# Patient Record
Sex: FEMALE | Race: WHITE | NOT HISPANIC OR LATINO | Employment: FULL TIME | URBAN - METROPOLITAN AREA
[De-identification: names, ages, dates, MRNs, and addresses within clinical notes are randomized per-mention and may not be internally consistent; named-entity substitution may affect disease eponyms.]

---

## 2017-10-24 ENCOUNTER — HOSPITAL ENCOUNTER (EMERGENCY)
Facility: HOSPITAL | Age: 37
Discharge: HOME | End: 2017-10-24
Attending: EMERGENCY MEDICINE

## 2017-10-24 VITALS
TEMPERATURE: 97 F | RESPIRATION RATE: 20 BRPM | HEART RATE: 88 BPM | SYSTOLIC BLOOD PRESSURE: 148 MMHG | BODY MASS INDEX: 47.84 KG/M2 | DIASTOLIC BLOOD PRESSURE: 91 MMHG | HEIGHT: 62 IN | OXYGEN SATURATION: 100 % | WEIGHT: 260 LBS

## 2017-10-24 DIAGNOSIS — R55 SYNCOPE: ICD-10-CM

## 2017-10-24 LAB
ALBUMIN SERPL BCP-MCNC: 4.2 G/DL
ALP SERPL-CCNC: 55 U/L
ALT SERPL W/O P-5'-P-CCNC: 21 U/L
ANION GAP SERPL CALC-SCNC: 15 MMOL/L
AST SERPL-CCNC: 14 U/L
B-HCG UR QL: NEGATIVE
BASOPHILS # BLD AUTO: 0.1 K/UL
BASOPHILS NFR BLD: 1 %
BILIRUB SERPL-MCNC: 0.3 MG/DL
BUN SERPL-MCNC: 17 MG/DL
CALCIUM SERPL-MCNC: 9.9 MG/DL
CHLORIDE SERPL-SCNC: 104 MMOL/L
CO2 SERPL-SCNC: 20 MMOL/L
CREAT SERPL-MCNC: 0.8 MG/DL
CTP QC/QA: YES
DIFFERENTIAL METHOD: ABNORMAL
EOSINOPHIL # BLD AUTO: 0.2 K/UL
EOSINOPHIL NFR BLD: 1.6 %
ERYTHROCYTE [DISTWIDTH] IN BLOOD BY AUTOMATED COUNT: 17.1 %
EST. GFR  (AFRICAN AMERICAN): >60 ML/MIN/1.73 M^2
EST. GFR  (NON AFRICAN AMERICAN): >60 ML/MIN/1.73 M^2
GLUCOSE SERPL-MCNC: 171 MG/DL
HCT VFR BLD AUTO: 41.8 %
HGB BLD-MCNC: 13.6 G/DL
LYMPHOCYTES # BLD AUTO: 3.9 K/UL
LYMPHOCYTES NFR BLD: 25.9 %
MCH RBC QN AUTO: 24.4 PG
MCHC RBC AUTO-ENTMCNC: 32.5 G/DL
MCV RBC AUTO: 75 FL
MONOCYTES # BLD AUTO: 0.6 K/UL
MONOCYTES NFR BLD: 3.8 %
NEUTROPHILS # BLD AUTO: 10.1 K/UL
NEUTROPHILS NFR BLD: 67.7 %
PLATELET # BLD AUTO: 456 K/UL
PMV BLD AUTO: 8.3 FL
POTASSIUM SERPL-SCNC: 4.3 MMOL/L
PROT SERPL-MCNC: 8.4 G/DL
RBC # BLD AUTO: 5.57 M/UL
SODIUM SERPL-SCNC: 139 MMOL/L
WBC # BLD AUTO: 14.9 K/UL

## 2017-10-24 PROCEDURE — 36415 COLL VENOUS BLD VENIPUNCTURE: CPT

## 2017-10-24 PROCEDURE — 81025 URINE PREGNANCY TEST: CPT | Performed by: EMERGENCY MEDICINE

## 2017-10-24 PROCEDURE — 99283 EMERGENCY DEPT VISIT LOW MDM: CPT | Mod: 25

## 2017-10-24 PROCEDURE — 85025 COMPLETE CBC W/AUTO DIFF WBC: CPT

## 2017-10-24 PROCEDURE — 80053 COMPREHEN METABOLIC PANEL: CPT

## 2017-10-24 PROCEDURE — 93005 ELECTROCARDIOGRAM TRACING: CPT

## 2017-10-24 NOTE — ED NOTES
"Pt aaox3. NAD. Here with c/o syncope that occurred at work pta. Pt states she was at work and felt lightheaded then passed out "on my register". Pt denies head injury. C/o HA. Denies vomiting diarrhea fever or chills. abd soft nontender and non distended. Skin warm dry and intact. resp even and unlabored. denies CP or SOB. Pt recently dx with viral illness.   "

## 2017-10-24 NOTE — ED PROVIDER NOTES
Chief complaint:  Loss of Consciousness (reports lightheaded and 1 episode of syncope today at work)      HPI:  Jennie Harmon is a 37 y.o. female presenting with recurrent episodes of syncope on numerous prior occasions similar to this.  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  No nausea, vomiting, diaphoresis, chest pain, dyspnea.  Patient walked into the next room and was lower down into a chair slowly with only brief loss of consciousness and no injury.  She was supported by other staff members.  There is no seizure activity.  There is no tongue biting or bladder or bowel incontinence.  She feels normal now.  She has a history of chronic headaches with similar, mild, frontal headache unchanged since earlier in the evening.  No visual changes, numbness, weakness.    ROS: As per HPI and below:  Positive for mild headache.  No neck pain, chest pain, dyspnea, hemoptysis, fever, current abdominal pain, rashes, blood in the stools, dysuria, numbness, weakness, swelling, vaginal discharge or pain. The patient/family denies dysphagia,  joint swelling, easy bruising.    Review of patient's allergies indicates:  No Known Allergies    Patient's Medications   New Prescriptions    No medications on file   Previous Medications    ALBUTEROL (ACCUNEB) 0.63 MG/3 ML NEBU    Take 0.63 mg by nebulization every 6 (six) hours as needed.    AMITRIPTYLINE (ELAVIL) 10 MG TABLET    Take 10 mg by mouth every evening.    CANAGLIFLOZIN (INVOKANA) 300 MG TAB    Take 300 mg by mouth once daily.    CITALOPRAM (CELEXA) 10 MG TABLET    Take 10 mg by mouth once daily.    GABAPENTIN (NEURONTIN) 300 MG CAPSULE    Take 300 mg by mouth 2 (two) times daily.    LISINOPRIL (PRINIVIL,ZESTRIL) 5 MG TABLET    Take 5 mg by mouth once daily.    LORATADINE (CLARITIN) 10 MG TABLET    Take 10 mg by mouth.    MEDROXYPROGESTERONE (DEPO-PROVERA) 150 MG/ML INJECTION    Inject 150 mg into  the muscle every 3 (three) months.    METFORMIN (GLUCOPHAGE-XR) 500 MG 24 HR TABLET    Take 500 mg by mouth 2 (two) times daily with meals.    MONTELUKAST (SINGULAIR) 10 MG TABLET    Take 10 mg by mouth every evening.    OMEPRAZOLE (PRILOSEC) 40 MG CAPSULE    Take 1 capsule (40 mg total) by mouth once daily.    SUCRALFATE (CARAFATE) 1 GRAM TABLET    Take 1 g by mouth 4 (four) times daily before meals and nightly.   Modified Medications    No medications on file   Discontinued Medications    No medications on file       PMH:  As per HPI and below:  Past Medical History:   Diagnosis Date    Anxiety     Depression     Diabetes mellitus     Glaucoma     Hx of oral aphthous ulcers     Portal hypertension      Past Surgical History:   Procedure Laterality Date    APPENDECTOMY      TONSILLECTOMY         Social History     Social History    Marital status:      Spouse name: N/A    Number of children: N/A    Years of education: N/A     Social History Main Topics    Smoking status: Never Smoker    Smokeless tobacco: None    Alcohol use No    Drug use: No    Sexual activity: Yes     Partners: Male     Other Topics Concern    None     Social History Narrative    None       History reviewed. No pertinent family history.    Physical Exam:    Vitals:    10/24/17 0618   BP: (!) 148/91   Pulse: 88   Resp: 20   Temp: 97.2 °F (36.2 °C)     GENERAL:  No apparent distress.  Alert.    HEENT:  Moist mucous membranes.  Normocephalic and atraumatic.  No tongue abrasions.    NECK:  No swelling.  Midline trachea. Supple.    CARDIOVASCULAR:  Regular rate and rhythm.  2+ radial pulses.  No murmurs auscultated.  PULMONARY:  Lungs clear to auscultation bilaterally.  No wheezes, rales, or rhonci.    ABDOMEN:  Non-tender and non-distended.    EXTREMITIES:  Warm and well perfused.  Brisk capillary refill.  Minimal peripheral edema.  2+ DP/PT pulses.  NEUROLOGICAL:  Normal mental status.  Appropriate and conversant.  5/5  strength and sensation.  CN III through XII intact.    SKIN:  No rashes or ecchymoses.    BACK:  Atraumatic.  No CVA tenderness to palpation.      Labs Reviewed   CBC W/ AUTO DIFFERENTIAL - Abnormal; Notable for the following:        Result Value    WBC 14.90 (*)     RBC 5.57 (*)     MCV 75 (*)     MCH 24.4 (*)     RDW 17.1 (*)     Platelets 456 (*)     MPV 8.3 (*)     Gran # 10.1 (*)     Mono% 3.8 (*)     All other components within normal limits   COMPREHENSIVE METABOLIC PANEL - Abnormal; Notable for the following:     CO2 20 (*)     Glucose 171 (*)     All other components within normal limits   POCT URINE PREGNANCY - Normal       Current Discharge Medication List      CONTINUE these medications which have NOT CHANGED    Details   albuterol (ACCUNEB) 0.63 mg/3 mL Nebu Take 0.63 mg by nebulization every 6 (six) hours as needed.      amitriptyline (ELAVIL) 10 MG tablet Take 10 mg by mouth every evening.      canagliflozin (INVOKANA) 300 mg Tab Take 300 mg by mouth once daily.      citalopram (CELEXA) 10 MG tablet Take 10 mg by mouth once daily.      gabapentin (NEURONTIN) 300 MG capsule Take 300 mg by mouth 2 (two) times daily.      lisinopril (PRINIVIL,ZESTRIL) 5 MG tablet Take 5 mg by mouth once daily.      loratadine (CLARITIN) 10 mg tablet Take 10 mg by mouth.      medroxyPROGESTERone (DEPO-PROVERA) 150 mg/mL injection Inject 150 mg into the muscle every 3 (three) months.      metformin (GLUCOPHAGE-XR) 500 MG 24 hr tablet Take 500 mg by mouth 2 (two) times daily with meals.      montelukast (SINGULAIR) 10 mg tablet Take 10 mg by mouth every evening.      omeprazole (PRILOSEC) 40 MG capsule Take 1 capsule (40 mg total) by mouth once daily.  Qty: 60 capsule, Refills: 1    Associated Diagnoses: Indigestion      sucralfate (CARAFATE) 1 gram tablet Take 1 g by mouth 4 (four) times daily before meals and nightly.             Orders Placed This Encounter   Procedures    CBC auto differential    Comprehensive  metabolic panel    Orthostatic blood pressure    POCT urine pregnancy    EKG 12-lead       Imaging Results    None         ED Course as of Oct 24 0728   Tue Oct 24, 2017   0705 EKG:  NSR, rate of 83, normal intervals and axis.  Isolated T-wave inversion in III without reciprocal changes.  There are no acute ST or T wave changes suggestive of acute ischemia or infarction.  No sign of arrhythmia.    [MR]      ED Course User Index  [MR] Rito Jarquin MD       MDM:    37 y.o. female with recurrent episode of syncope with gradual onset preceded by lightheadedness with multiple numerous previous episodes.  Very low suspicion for emergent, life-threatening etiology such as malignant arrhythmia, ACS.  I do not think prolonged cardiac monitoring her cardiac biomarkers indicated.  She is appropriate for outpatient cardiology follow-up for reassessment.  Patient reports she has followed up with different cardiologists in [place] without clear etiology found including on a loop recorder.  I have very low suspicion for alternative causes of headache such as intracranial hemorrhage, ischemic process, meningitis, idiopathic intracranial hypertension, or cavernous venous sinus thrombosis.  The patient has a non-focal neurological examination with history not consistent with emergent causes of headache warranting present therapeutic intervention.  I do not think further brain imaging or lumbar puncture are indicated at this time.  Other labs reviewed.  Nonspecific leukocytosis noted with no other infectious etiologies evident.  I do not think antibiotics are indicated.  She denies concurrent hypoglycemia at times of syncope and there is no document hypoglycemia for this episode. Detailed return precautions reviewed.    Diagnoses:    1. Syncope     Rito Jarquin MD  10/24/17 0728

## 2019-12-19 ENCOUNTER — OFFICE VISIT (OUTPATIENT)
Dept: ENDOCRINOLOGY | Facility: CLINIC | Age: 39
End: 2019-12-19
Payer: COMMERCIAL

## 2019-12-19 VITALS
HEART RATE: 92 BPM | WEIGHT: 267.06 LBS | SYSTOLIC BLOOD PRESSURE: 130 MMHG | DIASTOLIC BLOOD PRESSURE: 78 MMHG | HEIGHT: 62 IN | BODY MASS INDEX: 49.15 KG/M2 | TEMPERATURE: 98 F

## 2019-12-19 DIAGNOSIS — E66.01 CLASS 3 SEVERE OBESITY DUE TO EXCESS CALORIES WITH SERIOUS COMORBIDITY AND BODY MASS INDEX (BMI) OF 45.0 TO 49.9 IN ADULT: ICD-10-CM

## 2019-12-19 DIAGNOSIS — E11.65 TYPE 2 DIABETES MELLITUS WITH HYPERGLYCEMIA, WITHOUT LONG-TERM CURRENT USE OF INSULIN: Primary | ICD-10-CM

## 2019-12-19 DIAGNOSIS — E04.2 MULTINODULAR GOITER: ICD-10-CM

## 2019-12-19 PROCEDURE — 99204 OFFICE O/P NEW MOD 45 MIN: CPT | Mod: S$GLB,,, | Performed by: INTERNAL MEDICINE

## 2019-12-19 PROCEDURE — 99999 PR PBB SHADOW E&M-EST. PATIENT-LVL IV: ICD-10-PCS | Mod: PBBFAC,,, | Performed by: INTERNAL MEDICINE

## 2019-12-19 PROCEDURE — 99999 PR PBB SHADOW E&M-EST. PATIENT-LVL IV: CPT | Mod: PBBFAC,,, | Performed by: INTERNAL MEDICINE

## 2019-12-19 PROCEDURE — 99204 PR OFFICE/OUTPT VISIT, NEW, LEVL IV, 45-59 MIN: ICD-10-PCS | Mod: S$GLB,,, | Performed by: INTERNAL MEDICINE

## 2019-12-19 RX ORDER — DEXAMETHASONE 1 MG/1
1 TABLET ORAL ONCE
Qty: 1 TABLET | Refills: 0 | Status: SHIPPED | OUTPATIENT
Start: 2019-12-19 | End: 2019-12-19

## 2019-12-19 RX ORDER — PIOGLITAZONEHYDROCHLORIDE 15 MG/1
15 TABLET ORAL
COMMUNITY
Start: 2019-12-10 | End: 2019-12-19 | Stop reason: SDUPTHER

## 2019-12-19 RX ORDER — ATORVASTATIN CALCIUM 10 MG/1
10 TABLET, FILM COATED ORAL DAILY
COMMUNITY
Start: 2019-12-05 | End: 2022-12-29

## 2019-12-19 RX ORDER — PIOGLITAZONEHYDROCHLORIDE 30 MG/1
30 TABLET ORAL DAILY
Qty: 90 TABLET | Refills: 3 | Status: SHIPPED | OUTPATIENT
Start: 2019-12-19 | End: 2022-12-29

## 2019-12-19 RX ORDER — GLIMEPIRIDE 2 MG/1
2 TABLET ORAL
Qty: 90 TABLET | Refills: 3 | Status: SHIPPED | OUTPATIENT
Start: 2019-12-19 | End: 2022-12-29

## 2019-12-19 NOTE — ASSESSMENT & PLAN NOTE
Reviewed goals of therapy - to get the best control we can without hypoglycemia. Goal <7. Last A1C upper 7s, not too far from goal actually   - had DM education recently, diet changes didn't stick. recommend yearly DM education  Medication changes:   Start: Glimepiride. 2 mg/day, can titrate up if needed   Increase actos to 30 mg/day   Continue metformin.  - discussed other options include GLP-1 (pt reports she was on a lot of those before, didn't notice much improvement in A1C though did lose 40 lbs with one of them) SGLT2 (tried before, got UTI/yeast issues), or basal insulin   -Advised frequent self blood glucose monitoring. Patient encouraged to document glucose results and bring them to every clinic visit    -Hypoglycemia precautions discussed. Instructed on precautions before driving.    -Close adherence to lifestyle changes recommended.    -Periodic follow ups for eye evaluations, foot care suggested.

## 2019-12-19 NOTE — ASSESSMENT & PLAN NOTE
Seen for a few years per patient   - Benign FNA in the past she thinks   - Obtain new US to evaluate size, consider FNA if needed

## 2019-12-19 NOTE — LETTER
December 19, 2019      Pankaj Olivera NP  [address]  [place] [address]           [place] [facility]  [address]  [place] [address]  Phone: [phone]          Patient: Jennie Harmon   MR Number: [id number]   YOB: 1980   Date of Visit: 12/19/2019       Dear Pankaj Olivera:    Thank you for referring Jennie Harmon to me for evaluation. Attached you will find relevant portions of my assessment and plan of care.    Last A1C was pretty close. May just need 1 more medication. GLP-1, insulin, or more pills. Increasing pioglitazone and adding back sulfonylurea should be enough to get A1C the rest of the way to 7. Next time I'd try and get her to restart GLP-1 since it did seem to help a lot with the weight. For thyroid she needs another ultrasound to ensure she doesn't need any more FNA/surgery/anything there.    If you have questions, please do not hesitate to call me. I look forward to following Jennie Harmon along with you.    Sincerely,    Stevie Sloan MD    Enclosure    If you would like to receive this communication electronically, please contact [email or web address] or [phone] to request more information on [employer] access.    For providers and/or their staff who would like to refer a patient to [facility], please contact us through our one-stop-shop provider referral line, [facility], at [phone].    If you feel you have received this communication in error or would no longer like to receive these types of communications, please e-mail [email or web address]

## 2019-12-19 NOTE — ASSESSMENT & PLAN NOTE
BMI 48, with DM2 and elevated A1C   - mentioned weight loss surgery is an option for diabetes, pt not interested   - discussed benefits of weight loss for diabetes. Pt worried about being too skinny   - 5-10% BW loss (15-30 lbs or so) is generally enough to have significant metabolic benefits   - can consider GLP-1 next time if a1c still above goal

## 2019-12-19 NOTE — PROGRESS NOTES
Subjective:      Chief Complaint: Diabetes and Thyroid Nodule    HPI: Jennie Harmon is a 39 y.o. female who is here for an initial evaluation for diabetes, thyroid nodules.    With regards to the diabetes:  Diagnosed with T2DM since around .    Known complications:     Retinopathy? No    Nephropathy? No    Neuropathy? Yes    CAD? No    CVA? No    Current regimen:   Actos 15   metformin 1000 mg BID    Missed doses? denies    Prior treatments:    Januvia, didn't work   Invokana, didn't work (got UTI)     # times a day testin-2/day  Log reviewed: No    Pre breakfast: 107-300. Mostly in the 200-250s    Hypoglycemic events? None     Lifestyle modification:   DM education - last visit: last month. Was recommended a 1500 colleen diet. Stuck with it for 2 days, didn't last. Pt reports she doesn't want to be skinny.   Exercise: not much lately.    Also has thyroid nodules:   left sided FNA 2x, benign per patient.    Current symptoms:   polyuria, polydipsia and vision changes  Pt denies:   nausea, vomit and diarrhea    Diabetes Management Status    Statin: Taking  ACE/ARB: Taking    Screening or Prevention Patient's value Goal Complete/Controlled?   HgA1C Testing and Control   No results found for: HGBA1C   q6months/Less than 7% No   Lipid profile Most Recent Lipid Panel Health Maintenance Topic Completion: Not Found Annually No   LDL control No results found for: LDLCALC Annually/Less than 100 mg/dl  No   Nephropathy screening No results found for: MICALBCREAT  Lab Results   Component Value Date    CREATININE 0.8 10/24/2017     Lab Results   Component Value Date    PROTEINUA Negative 10/15/2016    Annually No   Blood pressure BP Readings from Last 1 Encounters:   19 130/78    Less than 140/90 Yes   Dilated retinal exam Most Recent Eye Exam Date: Not Found Annually No   Foot exam   Most Recent Foot Exam Date: Not Found Annually No       Reviewed past medical, family, social history and updated as  appropriate.    Review of Systems   Constitutional: Positive for fatigue. Negative for unexpected weight change.   HENT: Positive for trouble swallowing.    Eyes: Positive for visual disturbance.   Respiratory: Positive for choking (sometimes). Negative for shortness of breath.    Cardiovascular: Positive for palpitations (some PVCs).   Gastrointestinal: Negative for diarrhea.   Endocrine: Positive for polydipsia and polyuria.   Genitourinary: Negative for frequency.   Musculoskeletal: Negative for back pain.   Neurological: Positive for numbness (in feet). Negative for light-headedness.   Psychiatric/Behavioral: The patient is nervous/anxious (sometimes).      Objective:     Vitals:    12/19/19 0816   BP: 130/78   Pulse: 92   Temp: 97.9 °F (36.6 °C)     BP Readings from Last 5 Encounters:   12/19/19 130/78   10/24/17 (!) 148/91   10/24/16 (!) 142/62   10/15/16 118/66   10/10/16 122/79     Physical Exam   Constitutional: She is oriented to person, place, and time. She appears well-developed and well-nourished.   HENT:   Head: Normocephalic and atraumatic.   Eyes: EOM are normal.   Neck: Normal range of motion. Neck supple. No thyromegaly present.   Cardiovascular: Normal rate and regular rhythm.   No murmur heard.  Pulmonary/Chest: Effort normal and breath sounds normal.   Abdominal: Soft. She exhibits no distension and no mass. There is no tenderness.   Musculoskeletal: Normal range of motion. She exhibits no edema or tenderness.   Neurological: She is alert and oriented to person, place, and time.   Psychiatric: She has a normal mood and affect. Judgment normal.   Vitals reviewed.    Wt Readings from Last 5 Encounters:   12/19/19 0816 121.2 kg (267 lb 1.4 oz)   10/24/17 0618 117.9 kg (260 lb)   10/24/16 0921 112.9 kg (249 lb)   10/15/16 1539 112.9 kg (249 lb)   10/10/16 1329 113.6 kg (250 lb 7.1 oz)       More recent labs in [facility] tab    Lab Results   Component Value Date     10/24/2017    K 4.3  10/24/2017     10/24/2017    CO2 20 (L) 10/24/2017     (H) 10/24/2017    BUN 17 10/24/2017    CREATININE 0.8 10/24/2017    CALCIUM 9.9 10/24/2017    PROT 8.4 10/24/2017    ALBUMIN 4.2 10/24/2017    BILITOT 0.3 10/24/2017    ALKPHOS 55 10/24/2017    AST 14 10/24/2017    ALT 21 10/24/2017    ANIONGAP 15 10/24/2017    ESTGFRAFRICA >60 10/24/2017    EGFRNONAA >60 10/24/2017    TSH 0.961 10/15/2016        Assessment/Plan:     Type 2 diabetes mellitus with hyperglycemia, without long-term current use of insulin  Reviewed goals of therapy - to get the best control we can without hypoglycemia. Goal <7. Last A1C upper 7s, not too far from goal actually   - had DM education recently, diet changes didn't stick. recommend yearly DM education  Medication changes:   Start: Glimepiride. 2 mg/day, can titrate up if needed   Increase actos to 30 mg/day   Continue metformin.  - discussed other options include GLP-1 (pt reports she was on a lot of those before, didn't notice much improvement in A1C though did lose 40 lbs with one of them) SGLT2 (tried before, got UTI/yeast issues), or basal insulin   -Advised frequent self blood glucose monitoring. Patient encouraged to document glucose results and bring them to every clinic visit    -Hypoglycemia precautions discussed. Instructed on precautions before driving.    -Close adherence to lifestyle changes recommended.    -Periodic follow ups for eye evaluations, foot care suggested.      Multinodular goiter  Seen for a few years per patient   - Benign FNA in the past she thinks   - Obtain new US to evaluate size, consider FNA if needed      Class 3 severe obesity due to excess calories with serious comorbidity and body mass index (BMI) of 45.0 to 49.9 in adult  BMI 48, with DM2 and elevated A1C   - mentioned weight loss surgery is an option for diabetes, pt not interested   - discussed benefits of weight loss for diabetes. Pt worried about being too skinny   - 5-10% BW loss  (15-30 lbs or so) is generally enough to have significant metabolic benefits   - can consider GLP-1 next time if a1c still above goal        Follow up in about 3 months (around 3/19/2020).

## 2019-12-19 NOTE — PATIENT INSTRUCTIONS
For the diabetes:   Increase actos to 30 mg/day.   Start glimepiride 2 mg once a day   Continue metformin    Check glucose 1-2 times a day (try to bring a log in next time).     For the nodule:   Will obtain ultrasound of the thyroid to see how the nodule is looking.    Will need a dexamethasone suppression test (take dexamethasone at 11pm at night, then get a blood test around 8am the next day) to ensure your body isn't making too many steroids.    Healthy Meals for Diabetes     A healthcare provider will help you develop a meal plan that fits your needs.   Ask your healthcare team to help you make a meal plan that fits your needs. Your meal plan tells you when to eat your meals and snacks, what kinds of foods to eat, and how much of each food to eat. You dont have to give up all the foods you like. But you do need to follow some guidelines.  Choose healthy carbohydrates  Starches, sugars, and fiber are all types of carbohydrates. Fiber can help lower your cholesterol and triglycerides. Fiber is also healthy for your heart. You should have 20 to 35 grams of total fiber each day. Fiber-rich foods include:  · Whole-grain breads and cereals  · Bulgur wheat  · Brown rice     · Whole-wheat pasta  · Fruits and vegetables  · Dry beans, and peas   Keep track of the amount of carbohydrates you eat. This can help you keep the right balance of physical activity and medicine. The amount of carbohydrates needed will vary for each person. It depends on many things such as your health, the medicines you take, and how active you are. Your healthcare team will help you figure out the right amount of carbohydrates for you. You may start with around 45 to 60 grams of carbohydrates per meal, depending on your situation.   Here are some examples of foods containing about 15 grams of carbohydrates (1 serving of carbohydrates):  · 1/2 cup of canned or frozen fruit  · A small piece of fresh fruit (4 ounces)  · 1 slice of bread  · 1/2 cup  of oatmeal  · 1/3 cup of rice  · 4 to 6 crackers  · 1/2 English muffin  · 1/2 cup of black beans  · 1/4 of a large baked potato (3 ounces)  · 2/3 cup of plain fat-free yogurt  · 1 cup of soup  · 1/2 cup of casserole  · 6 chicken nuggets  · 2-inch-square brownie or cake without frosting  · 2 small cookies  · 1/2 cup of ice cream or sherbet  Choose healthy protein foods  Eating protein that is low in fat can help you control your weight. It also helps keep your heart healthy. Low-fat protein foods include:  · Fish  · Plant proteins, such as dry beans and peas, nuts, and soy products like tofu and soymilk  · Lean meat with all visible fat removed  · Poultry with the skin removed  · Low-fat or nonfat milk, cheese, and yogurt  Limit unhealthy fats and sugar  Saturated and trans fats are unhealthy for your heart. They raise LDL (bad) cholesterol. Fat is also high in calories, so it can make you gain weight. To cut down on unhealthy fats and sugar, limit these foods:  · Butter or margarine  · Palm and palm kernel oils and coconut oil  · Cream  · Cheese  · Hennessy  · Lunch meats     · Ice cream  · Sweet bakery goods such as pies, muffins, and donuts  · Jams and jellies  · Candy bars  · Regular sodas   How much to eat  The amount of food you eat affects your blood sugar. It also affects your weight. Your healthcare team will tell you how much of each type of food you should eat.  · Use measuring cups and spoons and a food scale to measure serving sizes.  · Learn what a correct serving size looks like on your plate. This will help when you are away from home and cant measure your servings.  · Eat only the number of servings given on your meal plan for each food. Dont take seconds.  · Learn to read food labels. Be sure to look at serving size, total carbohydrates, fiber, calories, sugar, and saturated and trans fats. Look for healthier alternatives to foods that have added sugar.  · Plan ahead for parties so you can still have  a good time without going overboard with unhealthy food choices. Set a good example yourself by bringing a healthy dish to pot lucks.   Choose healthy snacks  When it comes to snacks, we usually think about foods with added sugar and fats. But there are many other options for healthier snack choices. Here are a few snack ideas to choose from:  Snacks with less than 5 grams of carbohydrates  · 1 piece of string cheese  · 3 celery sticks plus 1 tablespoon of peanut butter  · 5 cherry tomatoes plus 1 tablespoon of ranch dressing  · 1 hard-boiled egg  · 1/4 cup of fresh blueberries  ·  5 baby carrots  · 1 cup of light popcorn  · 1/2 cup of sugar-free gelatin  · 15 almonds  Snacks with about 10 to 20 grams of carbohydrates  · 1/3 cup of hummus plus 1 cup of fresh cut nonstarchy vegetables (carrots, green peppers, broccoli, celery, or a combination)  · 1/2 cup of fresh or canned fruit plus 1/4 cup of cottage cheese  · 1/2 cup of tuna salad with 4 crackers  · 2 rice cakes and a tablespoon of peanut butter  · 1 small apple or orange  · 3 cups light popcorn  · 1/2 of a turkey sandwich (1 slice of whole-wheat bread, 2 ounces of turkey, and mustard)  Portion sizes are important to controlling your blood sugar and staying at a healthy weight. Stock up on healthy snack items so you always have them on hand.  When to eat  Your meal plan will likely include breakfast, lunch, dinner, and some snacks.  · Try to eat your meals and snacks at about the same times each day.  · Eat all your meals and snacks. Skipping a meal or snack can make your blood sugar drop too low. It can also cause you to eat too much at the next meal or snack. Then your blood sugar could get too high.  Date Last Reviewed: 7/1/2016  © 7002-6017 [employer]. [address] [place] [address], [place] [address]. All rights reserved. This information is not intended as a substitute for professional medical care. Always follow your healthcare professional's  instructions.

## 2019-12-24 ENCOUNTER — PATIENT MESSAGE (OUTPATIENT)
Dept: ENDOCRINOLOGY | Facility: CLINIC | Age: 39
End: 2019-12-24

## 2019-12-24 NOTE — TELEPHONE ENCOUNTER
Lab reviewed.  12/23/2019, 622am lab: TSH 1.77   - Also had ordered cortisol for a dex suppression test, but no result for cortisol included so not sure if it was done or not.

## 2019-12-30 ENCOUNTER — TELEPHONE (OUTPATIENT)
Dept: ENDOCRINOLOGY | Facility: CLINIC | Age: 39
End: 2019-12-30

## 2020-01-02 ENCOUNTER — PATIENT MESSAGE (OUTPATIENT)
Dept: ENDOCRINOLOGY | Facility: CLINIC | Age: 40
End: 2020-01-02

## 2020-01-02 ENCOUNTER — TELEPHONE (OUTPATIENT)
Dept: ENDOCRINOLOGY | Facility: CLINIC | Age: 40
End: 2020-01-02

## 2020-01-02 DIAGNOSIS — E04.2 MULTINODULAR GOITER: Primary | ICD-10-CM

## 2020-01-02 NOTE — TELEPHONE ENCOUNTER
----- Message from Edgar Gagnon sent at 1/2/2020  8:29 AM CST -----  Contact: pt  Type:  Test Results    Who Called:  pt  Name of Test (Lab/Mammo/Etc):  Blood work and US  Date of Test:  12.24.19  Ordering Provider:  Dr Menezes  Where the test was performed:  [facility] Call Back Number:  [phone]  Additional Information:

## 2020-01-03 NOTE — TELEPHONE ENCOUNTER
US report in CareEverywhere:    FINDINGS:  RIGHT lobe: The right lobe of the thyroid is normal in size and  heterogeneous in background echogenicity measuring 4.2 x 1.6 x 1.4 cm.  The background vascularity of the thyroid gland is normal. No thyroid  nodules were identified.     LEFT lobe: The left lobe of the thyroid is enlarged in size and normal  in background echogenicity measuring 5.5 x 2.7 x 2.7 cm. The  background vascularity of the thyroid gland is normal. Heterogeneous  isoechoic nodule in the left lobe measuring 4.2 x 3.1 x 2.5 cm.    ISTHMUS: The thyroid isthmus measures 8 mm.     LYMPH NODES: No gross lymphadenopathy is seen in the central neck.    IMPRESSION:  Dominant solitary solid nodule in the left lobe of the thyroid gland,  consider (ultrasound-guided) FNA for further evaluation.    Large left sided nodule needs FNA.    Some labs also in [facility]Everywhere: 12/23/2019   TSH 1.77   Cortisol 0.87    Results sent via [employer] [facility] [employer]

## 2020-01-06 ENCOUNTER — TELEPHONE (OUTPATIENT)
Dept: ENDOCRINOLOGY | Facility: CLINIC | Age: 40
End: 2020-01-06

## 2020-01-08 ENCOUNTER — TELEPHONE (OUTPATIENT)
Dept: ADMINISTRATIVE | Facility: HOSPITAL | Age: 40
End: 2020-01-08

## 2020-01-13 ENCOUNTER — PATIENT MESSAGE (OUTPATIENT)
Dept: ENDOCRINOLOGY | Facility: CLINIC | Age: 40
End: 2020-01-13

## 2020-02-03 ENCOUNTER — TELEPHONE (OUTPATIENT)
Dept: ENDOCRINOLOGY | Facility: CLINIC | Age: 40
End: 2020-02-03

## 2020-02-03 NOTE — TELEPHONE ENCOUNTER
----- Message from Peter Antonio sent at 2/3/2020 11:59 AM CST -----  Contact: Patient  Type: Needs Medical Advice    Who Called:  Patient  Best Call Back Number: [phone]  Additional Information: Patient would like to discuss previous biopsy. Please call to advise. Thanks!~

## 2020-02-04 ENCOUNTER — PATIENT MESSAGE (OUTPATIENT)
Dept: ENDOCRINOLOGY | Facility: CLINIC | Age: 40
End: 2020-02-04

## 2020-02-04 NOTE — TELEPHONE ENCOUNTER
FNA results faxed here. [place] category 2, benign.    Consistent with colloid nodule vs macrofollicular nodule.    Hypocellular with rare follicular cells, abundant colloid.    Will US after 1 year.

## 2020-05-07 ENCOUNTER — PATIENT MESSAGE (OUTPATIENT)
Dept: ENDOCRINOLOGY | Facility: CLINIC | Age: 40
End: 2020-05-07

## 2020-05-19 ENCOUNTER — PATIENT MESSAGE (OUTPATIENT)
Dept: ENDOCRINOLOGY | Facility: CLINIC | Age: 40
End: 2020-05-19

## 2020-05-19 DIAGNOSIS — E04.2 MULTINODULAR GOITER: Primary | ICD-10-CM

## 2020-05-19 DIAGNOSIS — E11.65 TYPE 2 DIABETES MELLITUS WITH HYPERGLYCEMIA, WITHOUT LONG-TERM CURRENT USE OF INSULIN: ICD-10-CM

## 2020-05-19 NOTE — TELEPHONE ENCOUNTER
Sounds like she may be having some hypothyroid symptoms, vs still recovering from infection. Can check labs, have virtual or even phone f/u appointment at patient's convenience.

## 2020-06-24 ENCOUNTER — PATIENT MESSAGE (OUTPATIENT)
Dept: ENDOCRINOLOGY | Facility: CLINIC | Age: 40
End: 2020-06-24

## 2020-11-20 NOTE — TELEPHONE ENCOUNTER
----- Message from Leny Mensah sent at 12/30/2019  2:05 PM CST -----  Contact: Patient  Type: Needs Medical Advice    Who Called:  Patient  Best Call Back Number:   Additional Information: Calling to speak with the nurse about her lab results. Unsure if they have been received by the office yet but she got her results from the lab where she got them done and she wishes to speak about them.    
Need to get labs results and US from [facility] for patient   
n/a

## 2022-08-31 ENCOUNTER — TELEPHONE (OUTPATIENT)
Dept: HEMATOLOGY/ONCOLOGY | Facility: CLINIC | Age: 42
End: 2022-08-31

## 2022-08-31 NOTE — TELEPHONE ENCOUNTER
Spoke to Fred Faustino regarding referral to Hematology patient declined she asked to be scheduled with Dr Hammonds/[facility] she was advised that we will forward this referral to his office she verbalized understanding

## 2022-09-30 ENCOUNTER — LAB VISIT (OUTPATIENT)
Dept: LAB | Facility: HOSPITAL | Age: 42
End: 2022-09-30
Attending: INTERNAL MEDICINE
Payer: COMMERCIAL

## 2022-09-30 ENCOUNTER — OFFICE VISIT (OUTPATIENT)
Dept: HEMATOLOGY/ONCOLOGY | Facility: CLINIC | Age: 42
End: 2022-09-30
Payer: COMMERCIAL

## 2022-09-30 VITALS
TEMPERATURE: 98 F | WEIGHT: 268.38 LBS | BODY MASS INDEX: 49.09 KG/M2 | DIASTOLIC BLOOD PRESSURE: 65 MMHG | SYSTOLIC BLOOD PRESSURE: 139 MMHG | RESPIRATION RATE: 16 BRPM | HEART RATE: 74 BPM

## 2022-09-30 DIAGNOSIS — D72.9 NEUTROPHILIC LEUKOCYTOSIS: Primary | ICD-10-CM

## 2022-09-30 DIAGNOSIS — D53.9 NON MEGALOBLASTIC NUTRITIONAL ANEMIA: ICD-10-CM

## 2022-09-30 DIAGNOSIS — D72.9 NEUTROPHILIC LEUKOCYTOSIS: ICD-10-CM

## 2022-09-30 LAB
BASOPHILS # BLD AUTO: 0.05 K/UL (ref 0–0.2)
BASOPHILS NFR BLD: 0.4 % (ref 0–1.9)
DIFFERENTIAL METHOD: ABNORMAL
EOSINOPHIL # BLD AUTO: 0.3 K/UL (ref 0–0.5)
EOSINOPHIL NFR BLD: 2.2 % (ref 0–8)
ERYTHROCYTE [DISTWIDTH] IN BLOOD BY AUTOMATED COUNT: 14.5 % (ref 11.5–14.5)
FERRITIN SERPL-MCNC: 39 NG/ML (ref 20–300)
FOLATE SERPL-MCNC: 20.3 NG/ML (ref 4–24)
HCT VFR BLD AUTO: 39 % (ref 37–48.5)
HGB BLD-MCNC: 12.8 G/DL (ref 12–16)
IMM GRANULOCYTES # BLD AUTO: 0.06 K/UL (ref 0–0.04)
IMM GRANULOCYTES NFR BLD AUTO: 0.5 % (ref 0–0.5)
LYMPHOCYTES # BLD AUTO: 4.7 K/UL (ref 1–4.8)
LYMPHOCYTES NFR BLD: 39.4 % (ref 18–48)
MCH RBC QN AUTO: 27.9 PG (ref 27–31)
MCHC RBC AUTO-ENTMCNC: 32.8 G/DL (ref 32–36)
MCV RBC AUTO: 85 FL (ref 82–98)
MONOCYTES # BLD AUTO: 0.6 K/UL (ref 0.3–1)
MONOCYTES NFR BLD: 4.9 % (ref 4–15)
NEUTROPHILS # BLD AUTO: 6.3 K/UL (ref 1.8–7.7)
NEUTROPHILS NFR BLD: 52.6 % (ref 38–73)
NRBC BLD-RTO: 0 /100 WBC
PLATELET # BLD AUTO: 412 K/UL (ref 150–450)
PMV BLD AUTO: 9.2 FL (ref 9.2–12.9)
RBC # BLD AUTO: 4.59 M/UL (ref 4–5.4)
RETICS/RBC NFR AUTO: 2.1 % (ref 0.5–2.5)
VIT B12 SERPL-MCNC: 862 PG/ML (ref 210–950)
WBC # BLD AUTO: 11.92 K/UL (ref 3.9–12.7)

## 2022-09-30 PROCEDURE — 4010F PR ACE/ARB THEARPY RXD/TAKEN: ICD-10-PCS | Mod: CPTII,S$GLB,, | Performed by: INTERNAL MEDICINE

## 2022-09-30 PROCEDURE — 82746 ASSAY OF FOLIC ACID SERUM: CPT | Performed by: INTERNAL MEDICINE

## 2022-09-30 PROCEDURE — 99204 OFFICE O/P NEW MOD 45 MIN: CPT | Mod: S$GLB,,, | Performed by: INTERNAL MEDICINE

## 2022-09-30 PROCEDURE — 3008F BODY MASS INDEX DOCD: CPT | Mod: CPTII,S$GLB,, | Performed by: INTERNAL MEDICINE

## 2022-09-30 PROCEDURE — 84207 ASSAY OF VITAMIN B-6: CPT | Performed by: INTERNAL MEDICINE

## 2022-09-30 PROCEDURE — 82668 ASSAY OF ERYTHROPOIETIN: CPT | Performed by: INTERNAL MEDICINE

## 2022-09-30 PROCEDURE — 99204 PR OFFICE/OUTPT VISIT, NEW, LEVL IV, 45-59 MIN: ICD-10-PCS | Mod: S$GLB,,, | Performed by: INTERNAL MEDICINE

## 2022-09-30 PROCEDURE — 4010F ACE/ARB THERAPY RXD/TAKEN: CPT | Mod: CPTII,S$GLB,, | Performed by: INTERNAL MEDICINE

## 2022-09-30 PROCEDURE — 3008F PR BODY MASS INDEX (BMI) DOCUMENTED: ICD-10-PCS | Mod: CPTII,S$GLB,, | Performed by: INTERNAL MEDICINE

## 2022-09-30 PROCEDURE — 1159F PR MEDICATION LIST DOCUMENTED IN MEDICAL RECORD: ICD-10-PCS | Mod: CPTII,S$GLB,, | Performed by: INTERNAL MEDICINE

## 2022-09-30 PROCEDURE — 3075F SYST BP GE 130 - 139MM HG: CPT | Mod: CPTII,S$GLB,, | Performed by: INTERNAL MEDICINE

## 2022-09-30 PROCEDURE — 3075F PR MOST RECENT SYSTOLIC BLOOD PRESS GE 130-139MM HG: ICD-10-PCS | Mod: CPTII,S$GLB,, | Performed by: INTERNAL MEDICINE

## 2022-09-30 PROCEDURE — 85025 COMPLETE CBC W/AUTO DIFF WBC: CPT | Performed by: INTERNAL MEDICINE

## 2022-09-30 PROCEDURE — 1159F MED LIST DOCD IN RCRD: CPT | Mod: CPTII,S$GLB,, | Performed by: INTERNAL MEDICINE

## 2022-09-30 PROCEDURE — 82728 ASSAY OF FERRITIN: CPT | Performed by: INTERNAL MEDICINE

## 2022-09-30 PROCEDURE — 3078F DIAST BP <80 MM HG: CPT | Mod: CPTII,S$GLB,, | Performed by: INTERNAL MEDICINE

## 2022-09-30 PROCEDURE — 36415 COLL VENOUS BLD VENIPUNCTURE: CPT | Performed by: INTERNAL MEDICINE

## 2022-09-30 PROCEDURE — 3078F PR MOST RECENT DIASTOLIC BLOOD PRESSURE < 80 MM HG: ICD-10-PCS | Mod: CPTII,S$GLB,, | Performed by: INTERNAL MEDICINE

## 2022-09-30 PROCEDURE — 82607 VITAMIN B-12: CPT | Performed by: INTERNAL MEDICINE

## 2022-09-30 PROCEDURE — 85045 AUTOMATED RETICULOCYTE COUNT: CPT | Performed by: INTERNAL MEDICINE

## 2022-09-30 RX ORDER — DICYCLOMINE HYDROCHLORIDE 10 MG/1
10 CAPSULE ORAL
Status: ON HOLD | COMMUNITY
End: 2023-01-18

## 2022-09-30 RX ORDER — TRAMADOL HYDROCHLORIDE 50 MG/1
50 TABLET ORAL EVERY 6 HOURS PRN
COMMUNITY
Start: 2022-08-31 | End: 2023-01-13 | Stop reason: ALTCHOICE

## 2022-09-30 RX ORDER — METOPROLOL SUCCINATE 25 MG/1
25 TABLET, EXTENDED RELEASE ORAL
COMMUNITY
Start: 2022-06-20

## 2022-09-30 RX ORDER — SPIRONOLACTONE 25 MG/1
12.5 TABLET ORAL
COMMUNITY
End: 2023-01-13 | Stop reason: ALTCHOICE

## 2022-09-30 RX ORDER — HYDROXYZINE HYDROCHLORIDE 10 MG/1
10 TABLET, FILM COATED ORAL 2 TIMES DAILY
COMMUNITY
Start: 2022-08-29

## 2022-09-30 RX ORDER — GLIPIZIDE 2.5 MG/1
2.5 TABLET, EXTENDED RELEASE ORAL DAILY
COMMUNITY
Start: 2021-12-02 | End: 2023-01-13

## 2022-09-30 RX ORDER — MELOXICAM 15 MG/1
15 TABLET ORAL DAILY
COMMUNITY
Start: 2022-09-12 | End: 2023-01-13 | Stop reason: ALTCHOICE

## 2022-09-30 RX ORDER — ONDANSETRON 4 MG/1
1 TABLET, ORALLY DISINTEGRATING ORAL EVERY 8 HOURS PRN
COMMUNITY
Start: 2022-03-15 | End: 2023-03-15

## 2022-09-30 RX ORDER — FLUTICASONE PROPIONATE 50 MCG
2 SPRAY, SUSPENSION (ML) NASAL
Status: ON HOLD | COMMUNITY
End: 2023-01-18

## 2022-09-30 RX ORDER — NITROFURANTOIN 25; 75 MG/1; MG/1
100 CAPSULE ORAL 2 TIMES DAILY
Status: ON HOLD | COMMUNITY
Start: 2022-09-07 | End: 2023-01-18

## 2022-09-30 RX ORDER — PSEUDOEPHEDRINE HCL 30 MG
30 TABLET ORAL EVERY 6 HOURS PRN
COMMUNITY
Start: 2022-04-14

## 2022-09-30 RX ORDER — PANTOPRAZOLE SODIUM 40 MG/1
40 TABLET, DELAYED RELEASE ORAL
COMMUNITY
Start: 2022-07-05

## 2022-09-30 RX ORDER — NAPROXEN 375 MG/1
375 TABLET ORAL
COMMUNITY
Start: 2022-06-17 | End: 2023-06-09

## 2022-09-30 NOTE — PROGRESS NOTES
[place] hematology Oncology in office initial encounter note     2022    Subjective:      Patient ID:   Jennie Harmon  42 y.o. female  1980  Mehran Olivera NP      Chief Complaint:   Increased white blood cell count    HPI:  42 y.o. female has been found to have an increased white blood cell count over the last several years.  Approximately 5 months ago she was hospitalized with an infected kidney stone, pyelonephritis, and sepsis.    Medical problems include anxiety depression, diabetes, glaucoma, oral aphthous ulcer history and portal hypertension.  She also has multi nodular goiter and obesity.    Her sepsis event was complicated by an episode of congestive heart failure.    Surgeries include tonsillectomy, appendectomy, and placement of a ureteral stent.  She dislodged and removed the stent herself.    She is a  1 para 0 miscarriage 1 individual.  She had ablation procedure for heavy menses and does not have menses now.  She had onset of her menses at age 11, she has never had a breast biopsy.    She does not smoke, she does not drink alcohol with regularity, she gives a history of allergy to oxycodone, and she works as a CNA  at [employer].      Medications include Celexa, Neurontin, metformin, and Protonix.    She has an aunt with breast cancer history.  Her mother has multiple sclerosis and arthritis.  Her father has COPD and congestive heart failure history.  She has a sister with multiple sclerosis, a brother who is an IV drug user and a brother who was murdered.  She she does not have any children.    ROS:   GEN: normal without any fever, night sweats or weight loss  HEENT: See HPI  CV: normal with no CP, SOB, PND, LIVE or orthopnea  PULM: normal with no SOB, cough, hemoptysis, sputum or pleuritic pain  GI: normal with no abdominal pain, nausea, vomiting, constipation, diarrhea, melanotic stools, BRBPR, or hematemesis  : See HPI  BREAST: normal with no mass,  discharge, pain  SKIN: normal with no rash, erythema, bruising, or swelling     Past Medical History:   Diagnosis Date    Anxiety     Depression     Diabetes mellitus     Glaucoma     Hx of oral aphthous ulcers     Portal hypertension      Past Surgical History:   Procedure Laterality Date    APPENDECTOMY      TONSILLECTOMY         Review of patient's allergies indicates:   Allergen Reactions    Oxycodone Itching     Social History     Socioeconomic History    Marital status:    Tobacco Use    Smoking status: Never   Substance and Sexual Activity    Alcohol use: No    Drug use: No    Sexual activity: Yes     Partners: Male         Current Outpatient Medications:     citalopram (CELEXA) 10 MG tablet, Take 40 mg by mouth once daily. , Disp: , Rfl:     dicyclomine (BENTYL) 10 MG capsule, Take 10 mg by mouth., Disp: , Rfl:     fluticasone propionate (FLONASE) 50 mcg/actuation nasal spray, 2 sprays by Nasal route., Disp: , Rfl:     gabapentin (NEURONTIN) 300 MG capsule, Take 600 mg by mouth 3 (three) times daily. , Disp: , Rfl:     glipiZIDE (GLUCOTROL) 2.5 MG TR24, Take 2.5 mg by mouth once daily., Disp: , Rfl:     hydrOXYzine HCL (ATARAX) 10 MG Tab, Take 10 mg by mouth 2 (two) times daily., Disp: , Rfl:     meloxicam (MOBIC) 15 MG tablet, Take 15 mg by mouth once daily., Disp: , Rfl:     metformin (GLUCOPHAGE-XR) 500 MG 24 hr tablet, Take 500 mg by mouth 2 (two) times daily with meals., Disp: , Rfl:     metoprolol succinate (TOPROL-XL) 25 MG 24 hr tablet, Take 25 mg by mouth., Disp: , Rfl:     naproxen (NAPROSYN) 375 MG tablet, Take 375 mg by mouth., Disp: , Rfl:     nitrofurantoin, macrocrystal-monohydrate, (MACROBID) 100 MG capsule, Take 100 mg by mouth 2 (two) times daily., Disp: , Rfl:     omeprazole (PRILOSEC) 40 MG capsule, Take 1 capsule (40 mg total) by mouth once daily. (Patient taking differently: Take 20 mg by mouth once daily.), Disp: 60 capsule, Rfl: 1    ondansetron (ZOFRAN-ODT) 4 MG TbDL, Take 1  tablet by mouth every 8 (eight) hours as needed., Disp: , Rfl:     pantoprazole (PROTONIX) 40 MG tablet, Take 40 mg by mouth., Disp: , Rfl:     pseudoephedrine (SUDAFED) 30 MG tablet, Take 30 mg by mouth every 6 (six) hours as needed., Disp: , Rfl:     spironolactone (ALDACTONE) 25 MG tablet, Take 12.5 mg by mouth., Disp: , Rfl:     traMADoL (ULTRAM) 50 mg tablet, Take 50 mg by mouth every 6 (six) hours as needed., Disp: , Rfl:     albuterol (ACCUNEB) 0.63 mg/3 mL Nebu, Take 0.63 mg by nebulization every 6 (six) hours as needed., Disp: , Rfl:     amitriptyline (ELAVIL) 10 MG tablet, Take 10 mg by mouth every evening., Disp: , Rfl:     atorvastatin (LIPITOR) 10 MG tablet, Take 10 mg by mouth once daily., Disp: , Rfl:     canagliflozin (INVOKANA) 300 mg Tab, Take 300 mg by mouth once daily., Disp: , Rfl:     glimepiride (AMARYL) 2 MG tablet, Take 1 tablet (2 mg total) by mouth before breakfast., Disp: 90 tablet, Rfl: 3    lisinopril (PRINIVIL,ZESTRIL) 5 MG tablet, Take 5 mg by mouth once daily., Disp: , Rfl:     loratadine (CLARITIN) 10 mg tablet, Take 10 mg by mouth., Disp: , Rfl:     medroxyPROGESTERone (DEPO-PROVERA) 150 mg/mL injection, Inject 150 mg into the muscle every 3 (three) months., Disp: , Rfl:     montelukast (SINGULAIR) 10 mg tablet, Take 10 mg by mouth every evening., Disp: , Rfl:     pioglitazone (ACTOS) 30 MG tablet, Take 1 tablet (30 mg total) by mouth once daily., Disp: 90 tablet, Rfl: 3    sucralfate (CARAFATE) 1 gram tablet, Take 1 g by mouth 4 (four) times daily before meals and nightly., Disp: , Rfl:           Objective:   Vitals:  Blood pressure 139/65, pulse 74, temperature 98.2 °F (36.8 °C), resp. rate 16, weight 121.7 kg (268 lb 6.4 oz).    Physical Examination:   GEN: no apparent distress, comfortable  HEAD: atraumatic and normocephalic  EYES: no pallor, no icterus  ENT:   no pharyngeal erythema, external ears WNL; no nasal discharge  NECK: no masses, thyroid normal, trachea midline, no  LAD/LN's, supple  CV: RRR with no murmur; normal pulse; normal S1 and S2; no pedal edema  CHEST: Normal respiratory effort; CTAB; normal breath sounds; no wheeze or crackles  ABDOM: nontender and nondistended; soft; no rebound/guarding, L/S NP  MUSC/Skeletal: ROM normal; no crepitus; joints normal  EXTREM: no clubbing, cyanosis, inflammation or swelling  SKIN: no rashes, lesions, ulcers, petechiae   : no CVAT  NEURO: grossly intact; motor/sensory WNL;  no tremors  PSYCH: normal mood, affect and behavior  LYMPH: normal cervical, supraclavicular, axillary LN's      Labs:   Lab Results   Component Value Date    WBC 11.92 09/30/2022    HGB 12.8 09/30/2022    HCT 39.0 09/30/2022    MCV 85 09/30/2022     09/30/2022    CMP  Sodium   Date Value Ref Range Status   10/24/2017 139 136 - 145 mmol/L Final     Potassium   Date Value Ref Range Status   10/24/2017 4.3 3.5 - 5.1 mmol/L Final     Chloride   Date Value Ref Range Status   10/24/2017 104 95 - 110 mmol/L Final     CO2   Date Value Ref Range Status   10/24/2017 20 (L) 23 - 29 mmol/L Final     Glucose   Date Value Ref Range Status   10/24/2017 171 (H) 70 - 110 mg/dL Final     BUN   Date Value Ref Range Status   10/24/2017 17 6 - 20 mg/dL Final     Creatinine   Date Value Ref Range Status   10/24/2017 0.8 0.5 - 1.4 mg/dL Final     Calcium   Date Value Ref Range Status   10/24/2017 9.9 8.7 - 10.5 mg/dL Final     Total Protein   Date Value Ref Range Status   10/24/2017 8.4 6.0 - 8.4 g/dL Final     Albumin   Date Value Ref Range Status   10/24/2017 4.2 3.5 - 5.2 g/dL Final     Total Bilirubin   Date Value Ref Range Status   10/24/2017 0.3 0.1 - 1.0 mg/dL Final     Comment:     For infants and newborns, interpretation of results should be based  on gestational age, weight and in agreement with clinical  observations.  Premature Infant recommended reference ranges:  Up to 24 hours.............<8.0 mg/dL  Up to 48 hours............<12.0 mg/dL  3-5  days..................<15.0 mg/dL  6-29 days.................<15.0 mg/dL       Alkaline Phosphatase   Date Value Ref Range Status   10/24/2017 55 55 - 135 U/L Final     AST   Date Value Ref Range Status   10/24/2017 14 10 - 40 U/L Final     ALT   Date Value Ref Range Status   10/24/2017 21 10 - 44 U/L Final     Anion Gap   Date Value Ref Range Status   10/24/2017 15 8 - 16 mmol/L Final     eGFR if    Date Value Ref Range Status   10/24/2017 >60 >60 mL/min/1.73 m^2 Final     eGFR if non    Date Value Ref Range Status   10/24/2017 >60 >60 mL/min/1.73 m^2 Final     Comment:     Calculation used to obtain the estimated glomerular filtration  rate (eGFR) is the CKD-EPI equation. Since race is unknown   in our information system, the eGFR values for   -American and Non--American patients are given   for each creatinine result.                  Hemoglobin is 12.9, white blood cell count is 94553, MCV is 85, platelet count is 537611, RDW is 14.6.  We reviewed lab work going back to 2016.  White blood count was 49699 in 2016.  White blood cell count was 45655 in 2017.  White blood count was 21248 in 2020.  White blood cell count was normal at 63855 in 2021.  White blood count recently was 12,900.  And on repeat determination white blood count was 09109 as above.    Assessment:    This individual has chronic intermittent leukocytosis over the last 5-6 years with history of pyelonephritis with sepsis approximately 5 months ago.  Her general medical problems include diabetes, obesity, multinodular goiter, anxiety depression, and episode of congestive heart failure.    The white blood cell count was as high as 30,000 earlier this year, probably around the time of her pyelonephritis and sepsis event.  On recent labs the differential is normal.    The chronic intermittent leukocytosis may be secondary to leukemoid reaction or may be reflective of myeloproliferative syndrome.  Also  nutritional deficiencies can cause anemia and leukocytosis.    I have ordered lab work today to check her vitamin levels and ferritin.  Also have assessed her renal function in red blood cell production and am getting flow cytometry studies and BCR/ABL studies on peripheral blood as a screening study for CML and myeloproliferative disorder.    She has a screening mammogram scheduled at [facility] for October 24th.    She will follow up with myself in 1 month to review the pending labs, and to review the mammogram and at that time we will make a decision on bone marrow examination.    I have explained and the patient understands all of  the current recommendation(s). I have answered all of their questions to the best of my ability and to their complete satisfaction.

## 2022-09-30 NOTE — LETTER
September 30, 2022        Pankaj Olivera NP  [address] [place] [address]             [facility] - Hematology Oncology  [address]  [place] [address]  Phone: [phone]  Fax: [phone]   Patient: Jennie Harmon   MR Number: [id number]   YOB: 1980   Date of Visit: 9/30/2022       Dear Dr. Olivera:    Thank you for referring Jennie Harmon to me for evaluation. Below are the relevant portions of my assessment and plan of care.    Hemoglobin is 12.9, white blood cell count is 98583, MCV is 85, platelet count is 059379, RDW is 14.6.  We reviewed lab work going back to 2016.  White blood count was 47687 in 2016.  White blood cell count was 99202 in 2017.  White blood count was 54343 in 2020.  White blood cell count was normal at 71206 in 2021.  White blood count recently was 12,900.  And on repeat determination white blood count was 33440 as above.    Assessment:    This individual has chronic intermittent leukocytosis over the last 5-6 years with history of pyelonephritis with sepsis approximately 5 months ago.  Her general medical problems include diabetes, obesity, multinodular goiter, anxiety depression, and episode of congestive heart failure.    The white blood cell count was as high as 30,000 earlier this year, probably around the time of her pyelonephritis and sepsis event.  On recent labs the differential is normal.    The chronic intermittent leukocytosis may be secondary to leukemoid reaction or may be reflective of myeloproliferative syndrome.  Also nutritional deficiencies can cause anemia and leukocytosis.    I have ordered lab work today to check her vitamin levels and ferritin.  Also have assessed her renal function in red blood cell production and am getting flow cytometry studies and BCR/ABL studies on peripheral blood as a screening study for CML and myeloproliferative disorder.    She has a screening mammogram scheduled at [facility] for October 24th.    She will  follow up with myself in 1 month to review the pending labs, and to review the mammogram and at that time we will make a decision on bone marrow examination.    I have explained and the patient understands all of  the current recommendation(s). I have answered all of their questions to the best of my ability and to their complete satisfaction.         If you have questions, please do not hesitate to call me. I look forward to following Jennie along with you.    Sincerely,      RIC Hammonds MD           CC    No Recipients

## 2022-10-02 LAB — EPO SERPL-ACNC: 12.3 MIU/ML (ref 2.6–18.5)

## 2022-10-04 LAB — VIT B6 SERPL-MCNC: 14.3 UG/L (ref 3.4–65.2)

## 2022-11-03 ENCOUNTER — OFFICE VISIT (OUTPATIENT)
Dept: HEMATOLOGY/ONCOLOGY | Facility: CLINIC | Age: 42
End: 2022-11-03
Payer: COMMERCIAL

## 2022-11-03 VITALS
DIASTOLIC BLOOD PRESSURE: 70 MMHG | TEMPERATURE: 98 F | HEIGHT: 62 IN | BODY MASS INDEX: 49.19 KG/M2 | WEIGHT: 267.31 LBS | RESPIRATION RATE: 18 BRPM | HEART RATE: 86 BPM | SYSTOLIC BLOOD PRESSURE: 119 MMHG

## 2022-11-03 DIAGNOSIS — D72.823 LEUKEMOID REACTION: Primary | ICD-10-CM

## 2022-11-03 PROCEDURE — 99213 OFFICE O/P EST LOW 20 MIN: CPT | Mod: S$GLB,,, | Performed by: INTERNAL MEDICINE

## 2022-11-03 PROCEDURE — 3074F SYST BP LT 130 MM HG: CPT | Mod: CPTII,S$GLB,, | Performed by: INTERNAL MEDICINE

## 2022-11-03 PROCEDURE — 3008F PR BODY MASS INDEX (BMI) DOCUMENTED: ICD-10-PCS | Mod: CPTII,S$GLB,, | Performed by: INTERNAL MEDICINE

## 2022-11-03 PROCEDURE — 3008F BODY MASS INDEX DOCD: CPT | Mod: CPTII,S$GLB,, | Performed by: INTERNAL MEDICINE

## 2022-11-03 PROCEDURE — 1159F MED LIST DOCD IN RCRD: CPT | Mod: CPTII,S$GLB,, | Performed by: INTERNAL MEDICINE

## 2022-11-03 PROCEDURE — 3078F DIAST BP <80 MM HG: CPT | Mod: CPTII,S$GLB,, | Performed by: INTERNAL MEDICINE

## 2022-11-03 PROCEDURE — 4010F ACE/ARB THERAPY RXD/TAKEN: CPT | Mod: CPTII,S$GLB,, | Performed by: INTERNAL MEDICINE

## 2022-11-03 PROCEDURE — 4010F PR ACE/ARB THEARPY RXD/TAKEN: ICD-10-PCS | Mod: CPTII,S$GLB,, | Performed by: INTERNAL MEDICINE

## 2022-11-03 PROCEDURE — 3074F PR MOST RECENT SYSTOLIC BLOOD PRESSURE < 130 MM HG: ICD-10-PCS | Mod: CPTII,S$GLB,, | Performed by: INTERNAL MEDICINE

## 2022-11-03 PROCEDURE — 99213 PR OFFICE/OUTPT VISIT, EST, LEVL III, 20-29 MIN: ICD-10-PCS | Mod: S$GLB,,, | Performed by: INTERNAL MEDICINE

## 2022-11-03 PROCEDURE — 3078F PR MOST RECENT DIASTOLIC BLOOD PRESSURE < 80 MM HG: ICD-10-PCS | Mod: CPTII,S$GLB,, | Performed by: INTERNAL MEDICINE

## 2022-11-03 PROCEDURE — 1159F PR MEDICATION LIST DOCUMENTED IN MEDICAL RECORD: ICD-10-PCS | Mod: CPTII,S$GLB,, | Performed by: INTERNAL MEDICINE

## 2022-11-05 NOTE — PROGRESS NOTES
[facility] hematology Oncology in office Subsequent encounter note     11/3/22    Subjective:      Patient ID:   Jennie Harmon  42 y.o. female  1980  Mehran Olivera NP      Chief Complaint:   Increased white blood cell count    HPI:  42 y.o. female has been found to have an increased white blood cell count over the last several years.  Approximately 5 months ago she was hospitalized with an infected kidney stone, pyelonephritis, and sepsis.    On current CBC, WBC 11,920 Normal, Ferritin 40.  10/26/22 mamm NL.  10/12/22 CAT fatty liver, constipation.    Medical problems include anxiety depression, diabetes, glaucoma, oral aphthous ulcer history and portal hypertension.  She also has multi nodular goiter and obesity.    Her sepsis event was complicated by an episode of congestive heart failure.    Surgeries include tonsillectomy, appendectomy, and placement of a ureteral stent.  She dislodged and removed the stent herself.    She is a  1 para 0 miscarriage 1 individual.  She had ablation procedure for heavy menses and does not have menses now.  She had onset of her menses at age 11, she has never had a breast biopsy.    She does not smoke, she does not drink alcohol with regularity, she gives a history of allergy to oxycodone, and she works as a CNA  at [employer].      Medications include Celexa, Neurontin, metformin, and Protonix.    She has an aunt with breast cancer history.  Her mother has multiple sclerosis and arthritis.  Her father has COPD and congestive heart failure history.  She has a sister with multiple sclerosis, a brother who is an IV drug user and a brother who was murdered.  She she does not have any children.    ROS:   GEN: normal without any fever, night sweats or weight loss  HEENT: See HPI  CV: normal with no CP, SOB, PND, LIVE or orthopnea  PULM: normal with no SOB, cough, hemoptysis, sputum or pleuritic pain  GI: normal with no abdominal pain, nausea, vomiting, constipation,  diarrhea, melanotic stools, BRBPR, or hematemesis  : See HPI  BREAST: normal with no mass, discharge, pain  SKIN: normal with no rash, erythema, bruising, or swelling     Past Medical History:   Diagnosis Date    Anxiety     Depression     Diabetes mellitus     Glaucoma     Hx of oral aphthous ulcers     Portal hypertension      Past Surgical History:   Procedure Laterality Date    APPENDECTOMY      TONSILLECTOMY         Review of patient's allergies indicates:   Allergen Reactions    Oxycodone Itching     Social History     Socioeconomic History    Marital status:    Tobacco Use    Smoking status: Never   Substance and Sexual Activity    Alcohol use: No    Drug use: No    Sexual activity: Yes     Partners: Male         Current Outpatient Medications:     citalopram (CELEXA) 10 MG tablet, Take 40 mg by mouth once daily. , Disp: , Rfl:     dicyclomine (BENTYL) 10 MG capsule, Take 10 mg by mouth., Disp: , Rfl:     fluticasone propionate (FLONASE) 50 mcg/actuation nasal spray, 2 sprays by Nasal route., Disp: , Rfl:     gabapentin (NEURONTIN) 300 MG capsule, Take 600 mg by mouth 3 (three) times daily. , Disp: , Rfl:     glipiZIDE (GLUCOTROL) 2.5 MG TR24, Take 2.5 mg by mouth once daily., Disp: , Rfl:     hydrOXYzine HCL (ATARAX) 10 MG Tab, Take 10 mg by mouth 2 (two) times daily., Disp: , Rfl:     meloxicam (MOBIC) 15 MG tablet, Take 15 mg by mouth once daily., Disp: , Rfl:     metformin (GLUCOPHAGE-XR) 500 MG 24 hr tablet, Take 500 mg by mouth 2 (two) times daily with meals., Disp: , Rfl:     metoprolol succinate (TOPROL-XL) 25 MG 24 hr tablet, Take 25 mg by mouth., Disp: , Rfl:     naproxen (NAPROSYN) 375 MG tablet, Take 375 mg by mouth., Disp: , Rfl:     nitrofurantoin, macrocrystal-monohydrate, (MACROBID) 100 MG capsule, Take 100 mg by mouth 2 (two) times daily., Disp: , Rfl:     omeprazole (PRILOSEC) 40 MG capsule, Take 1 capsule (40 mg total) by mouth once daily. (Patient taking differently: Take 20 mg by  "mouth once daily.), Disp: 60 capsule, Rfl: 1    ondansetron (ZOFRAN-ODT) 4 MG TbDL, Take 1 tablet by mouth every 8 (eight) hours as needed., Disp: , Rfl:     pantoprazole (PROTONIX) 40 MG tablet, Take 40 mg by mouth., Disp: , Rfl:     pseudoephedrine (SUDAFED) 30 MG tablet, Take 30 mg by mouth every 6 (six) hours as needed., Disp: , Rfl:     spironolactone (ALDACTONE) 25 MG tablet, Take 12.5 mg by mouth., Disp: , Rfl:     traMADoL (ULTRAM) 50 mg tablet, Take 50 mg by mouth every 6 (six) hours as needed., Disp: , Rfl:     albuterol (ACCUNEB) 0.63 mg/3 mL Nebu, Take 0.63 mg by nebulization every 6 (six) hours as needed., Disp: , Rfl:     amitriptyline (ELAVIL) 10 MG tablet, Take 10 mg by mouth every evening., Disp: , Rfl:     atorvastatin (LIPITOR) 10 MG tablet, Take 10 mg by mouth once daily., Disp: , Rfl:     canagliflozin (INVOKANA) 300 mg Tab, Take 300 mg by mouth once daily., Disp: , Rfl:     glimepiride (AMARYL) 2 MG tablet, Take 1 tablet (2 mg total) by mouth before breakfast., Disp: 90 tablet, Rfl: 3    lisinopril (PRINIVIL,ZESTRIL) 5 MG tablet, Take 5 mg by mouth once daily., Disp: , Rfl:     loratadine (CLARITIN) 10 mg tablet, Take 10 mg by mouth., Disp: , Rfl:     medroxyPROGESTERone (DEPO-PROVERA) 150 mg/mL injection, Inject 150 mg into the muscle every 3 (three) months., Disp: , Rfl:     montelukast (SINGULAIR) 10 mg tablet, Take 10 mg by mouth every evening., Disp: , Rfl:     pioglitazone (ACTOS) 30 MG tablet, Take 1 tablet (30 mg total) by mouth once daily., Disp: 90 tablet, Rfl: 3    sucralfate (CARAFATE) 1 gram tablet, Take 1 g by mouth 4 (four) times daily before meals and nightly., Disp: , Rfl:           Objective:   Vitals:  Blood pressure 119/70, pulse 86, temperature 97.9 °F (36.6 °C), resp. rate 18, height 5' 2" (1.575 m), weight 121.2 kg (267 lb 4.8 oz).    Physical Examination:   GEN: no apparent distress, comfortable  HEAD: atraumatic and normocephalic  EYES: no pallor, no icterus  ENT:   no " pharyngeal erythema, external ears WNL; no nasal discharge  NECK: no masses, thyroid normal, trachea midline, no LAD/LN's, supple  CV: RRR with no murmur; normal pulse; normal S1 and S2; no pedal edema  CHEST: Normal respiratory effort; CTAB; normal breath sounds; no wheeze or crackles  ABDOM: nontender and nondistended; soft; no rebound/guarding, L/S NP  MUSC/Skeletal: ROM normal; no crepitus; joints normal  EXTREM: no clubbing, cyanosis, inflammation or swelling  SKIN: no rashes, lesions, ulcers, petechiae   : no CVAT  NEURO: grossly intact; motor/sensory WNL;  no tremors  PSYCH: normal mood, affect and behavior  LYMPH: normal cervical, supraclavicular, axillary LN's      Labs:   Lab Results   Component Value Date    WBC 11.92 09/30/2022    HGB 12.8 09/30/2022    HCT 39.0 09/30/2022    MCV 85 09/30/2022     09/30/2022    CMP  Sodium   Date Value Ref Range Status   10/24/2017 139 136 - 145 mmol/L Final     Potassium   Date Value Ref Range Status   10/24/2017 4.3 3.5 - 5.1 mmol/L Final     Chloride   Date Value Ref Range Status   10/24/2017 104 95 - 110 mmol/L Final     CO2   Date Value Ref Range Status   10/24/2017 20 (L) 23 - 29 mmol/L Final     Glucose   Date Value Ref Range Status   10/24/2017 171 (H) 70 - 110 mg/dL Final     BUN   Date Value Ref Range Status   10/24/2017 17 6 - 20 mg/dL Final     Creatinine   Date Value Ref Range Status   10/24/2017 0.8 0.5 - 1.4 mg/dL Final     Calcium   Date Value Ref Range Status   10/24/2017 9.9 8.7 - 10.5 mg/dL Final     Total Protein   Date Value Ref Range Status   10/24/2017 8.4 6.0 - 8.4 g/dL Final     Albumin   Date Value Ref Range Status   10/24/2017 4.2 3.5 - 5.2 g/dL Final     Total Bilirubin   Date Value Ref Range Status   10/24/2017 0.3 0.1 - 1.0 mg/dL Final     Comment:     For infants and newborns, interpretation of results should be based  on gestational age, weight and in agreement with clinical  observations.  Premature Infant recommended reference  ranges:  Up to 24 hours.............<8.0 mg/dL  Up to 48 hours............<12.0 mg/dL  3-5 days..................<15.0 mg/dL  6-29 days.................<15.0 mg/dL       Alkaline Phosphatase   Date Value Ref Range Status   10/24/2017 55 55 - 135 U/L Final     AST   Date Value Ref Range Status   10/24/2017 14 10 - 40 U/L Final     ALT   Date Value Ref Range Status   10/24/2017 21 10 - 44 U/L Final     Anion Gap   Date Value Ref Range Status   10/24/2017 15 8 - 16 mmol/L Final     eGFR if    Date Value Ref Range Status   10/24/2017 >60 >60 mL/min/1.73 m^2 Final     eGFR if non    Date Value Ref Range Status   10/24/2017 >60 >60 mL/min/1.73 m^2 Final     Comment:     Calculation used to obtain the estimated glomerular filtration  rate (eGFR) is the CKD-EPI equation. Since race is unknown   in our information system, the eGFR values for   -American and Non--American patients are given   for each creatinine result.                  Hemoglobin is 12.9, white blood cell count [place] 99114, MCV is 85, platelet count is 231494, RDW is 14.6.  We reviewed lab work going back to 2016.  White blood count was 81552 in 2016.  White blood cell count was 20581 in 2017.  White blood count was 24277 in 2020.  White blood cell count was normal at 86016 in 2021.  White blood count recently was 12,900.  And on repeat determination white blood count was 34704 as above.    WBC 11,920 now, Normal, diff NL.  Assessment:    This individual has chronic intermittent leukocytosis over the last 5-6 years with history of pyelonephritis with sepsis approximately 5 months ago.  Her general medical problems include diabetes, obesity, multinodular goiter, anxiety depression, and episode of congestive heart failure.    The white blood cell count was as high as 30,000 earlier this year, probably around the time of her pyelonephritis and sepsis event.  On recent labs the differential is normal.    The chronic  intermittent leukocytosis may be secondary to leukemoid reaction or may be reflective of myeloproliferative syndrome.  Also nutritional deficiencies can cause anemia and leukocytosis.    I have ordered lab work today to check her vitamin levels and ferritin.  Also have assessed her renal function in red blood cell production and am getting flow cytometry studies and BCR/ABL studies on peripheral blood as a screening study for CML and myeloproliferative disorder.    Screening mammogram scheduled at [facility] for October 24th. Was NL.    WBC has normalized, suspected intermittent secondary leukocytosis.  Observe for now.  CBC and RTC 4 months.

## 2022-12-29 ENCOUNTER — OFFICE VISIT (OUTPATIENT)
Dept: ORTHOPEDICS | Facility: CLINIC | Age: 42
End: 2022-12-29
Payer: COMMERCIAL

## 2022-12-29 VITALS — HEIGHT: 62 IN | WEIGHT: 260 LBS | BODY MASS INDEX: 47.84 KG/M2

## 2022-12-29 DIAGNOSIS — S80.02XA CONTUSION OF LEFT KNEE AND LOWER LEG, INITIAL ENCOUNTER: ICD-10-CM
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
## 2022-12-29 DIAGNOSIS — G89.29 CHRONIC PAIN OF LEFT WRIST: ICD-10-CM

## 2022-12-29 DIAGNOSIS — G56.02 CARPAL TUNNEL SYNDROME ON LEFT: ICD-10-CM

## 2022-12-29 DIAGNOSIS — G56.02 CARPAL TUNNEL SYNDROME OF LEFT WRIST: ICD-10-CM

## 2022-12-29 DIAGNOSIS — G56.02 CARPAL TUNNEL SYNDROME OF LEFT WRIST: Primary | ICD-10-CM

## 2022-12-29 DIAGNOSIS — G56.01 CARPAL TUNNEL SYNDROME OF RIGHT WRIST: ICD-10-CM

## 2022-12-29 DIAGNOSIS — S80.12XA CONTUSION OF LEFT KNEE AND LOWER LEG, INITIAL ENCOUNTER: ICD-10-CM

## 2022-12-29 DIAGNOSIS — R29.6 MULTIPLE FALLS: Primary | ICD-10-CM
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
## 2022-12-29 DIAGNOSIS — M25.532 CHRONIC PAIN OF LEFT WRIST: ICD-10-CM

## 2022-12-29 PROCEDURE — 4010F PR ACE/ARB THEARPY RXD/TAKEN: ICD-10-PCS | Mod: CPTII,S$GLB,, | Performed by: PHYSICIAN ASSISTANT

## 2022-12-29 PROCEDURE — 99203 PR OFFICE/OUTPT VISIT, NEW, LEVL III, 30-44 MIN: ICD-10-PCS | Mod: S$GLB,,, | Performed by: PHYSICIAN ASSISTANT

## 2022-12-29 PROCEDURE — 99203 OFFICE O/P NEW LOW 30 MIN: CPT | Mod: S$GLB,,, | Performed by: PHYSICIAN ASSISTANT

## 2022-12-29 PROCEDURE — 1159F PR MEDICATION LIST DOCUMENTED IN MEDICAL RECORD: ICD-10-PCS | Mod: CPTII,S$GLB,, | Performed by: PHYSICIAN ASSISTANT

## 2022-12-29 PROCEDURE — 3008F PR BODY MASS INDEX (BMI) DOCUMENTED: ICD-10-PCS | Mod: CPTII,S$GLB,, | Performed by: PHYSICIAN ASSISTANT

## 2022-12-29 PROCEDURE — 4010F ACE/ARB THERAPY RXD/TAKEN: CPT | Mod: CPTII,S$GLB,, | Performed by: PHYSICIAN ASSISTANT

## 2022-12-29 PROCEDURE — 1159F MED LIST DOCD IN RCRD: CPT | Mod: CPTII,S$GLB,, | Performed by: PHYSICIAN ASSISTANT

## 2022-12-29 PROCEDURE — 3008F BODY MASS INDEX DOCD: CPT | Mod: CPTII,S$GLB,, | Performed by: PHYSICIAN ASSISTANT

## 2022-12-29 RX ORDER — MAGNESIUM 30 MG
TABLET ORAL ONCE
COMMUNITY
End: 2023-01-13

## 2022-12-29 NOTE — PROGRESS NOTES
[facility]  [address]  [place] [address]  Phone: [phone]   [phone]    Patient's PCP: Primary Doctor No  Referring Provider: No ref. provider found    Subjective:      Chief Complaint:   Chief Complaint   Patient presents with    Left Wrist - Pain     Fell in August, aggravated carpal tunnel, had EMG/NCV, MRI and CT done, hand is swollen c/o numbness, tingling and dropping thing, wears a brace    Left Knee - Pain     She fell yesterday, went to [facility], they took XR and gave immobilizer for contusion left knee, we can see ER visit notes but unable to see the films, she can barely bear weight, and very painful to bend       Past Medical History:   Diagnosis Date    Anxiety     Depression     Diabetes mellitus     Glaucoma     Hx of oral aphthous ulcers     Portal hypertension        Past Surgical History:   Procedure Laterality Date    APPENDECTOMY      TONSILLECTOMY         Current Outpatient Medications   Medication Sig    calcium carbonate/vitamin D3 (CALCIUM 500 + D ORAL) Take by mouth.    citalopram (CELEXA) 10 MG tablet Take 40 mg by mouth once daily.     gabapentin (NEURONTIN) 300 MG capsule Take 600 mg by mouth 3 (three) times daily.     hydrOXYzine HCL (ATARAX) 10 MG Tab Take 10 mg by mouth 2 (two) times daily.    magnesium 30 mg Tab Take by mouth once.    metoprolol succinate (TOPROL-XL) 25 MG 24 hr tablet Take 25 mg by mouth.    MULTIVITAMIN ORAL Take 1 tablet by mouth.    pantoprazole (PROTONIX) 40 MG tablet Take 40 mg by mouth.    pseudoephedrine (SUDAFED) 30 MG tablet Take 30 mg by mouth every 6 (six) hours as needed.    albuterol (ACCUNEB) 0.63 mg/3 mL Nebu Take 0.63 mg by nebulization every 6 (six) hours as needed.    amitriptyline (ELAVIL) 10 MG tablet Take 10 mg by mouth every evening.    atorvastatin (LIPITOR) 10 MG tablet Take 10 mg by mouth once daily.    canagliflozin (INVOKANA) 300 mg Tab Take 300 mg by mouth once daily.    dicyclomine (BENTYL) 10  MG capsule Take 10 mg by mouth.    fluticasone propionate (FLONASE) 50 mcg/actuation nasal spray 2 sprays by Nasal route.    glimepiride (AMARYL) 2 MG tablet Take 1 tablet (2 mg total) by mouth before breakfast.    glipiZIDE (GLUCOTROL) 2.5 MG TR24 Take 2.5 mg by mouth once daily.    lisinopril (PRINIVIL,ZESTRIL) 5 MG tablet Take 5 mg by mouth once daily.    loratadine (CLARITIN) 10 mg tablet Take 10 mg by mouth.    medroxyPROGESTERone (DEPO-PROVERA) 150 mg/mL injection Inject 150 mg into the muscle every 3 (three) months.    meloxicam (MOBIC) 15 MG tablet Take 15 mg by mouth once daily.    metformin (GLUCOPHAGE-XR) 500 MG 24 hr tablet Take 500 mg by mouth 2 (two) times daily with meals.    montelukast (SINGULAIR) 10 mg tablet Take 10 mg by mouth every evening.    naproxen (NAPROSYN) 375 MG tablet Take 375 mg by mouth.    nitrofurantoin, macrocrystal-monohydrate, (MACROBID) 100 MG capsule Take 100 mg by mouth 2 (two) times daily.    omeprazole (PRILOSEC) 40 MG capsule Take 1 capsule (40 mg total) by mouth once daily. (Patient not taking: Reported on 12/29/2022)    ondansetron (ZOFRAN-ODT) 4 MG TbDL Take 1 tablet by mouth every 8 (eight) hours as needed.    pioglitazone (ACTOS) 30 MG tablet Take 1 tablet (30 mg total) by mouth once daily.    spironolactone (ALDACTONE) 25 MG tablet Take 12.5 mg by mouth.    sucralfate (CARAFATE) 1 gram tablet Take 1 g by mouth 4 (four) times daily before meals and nightly.    traMADoL (ULTRAM) 50 mg tablet Take 50 mg by mouth every 6 (six) hours as needed.     No current facility-administered medications for this visit.       Review of patient's allergies indicates:   Allergen Reactions    Oxycodone Itching       No family history on file.    Social History     Socioeconomic History    Marital status:    Tobacco Use    Smoking status: Never   Substance and Sexual Activity    Alcohol use: No    Drug use: No    Sexual activity: Yes     Partners: Male       Prior to meeting with  the patient I reviewed the medical chart in [facility]. This included reviewing the previous progress notes from our office, review of the patient's last appointment with their primary care provider, review of any visits to the emergency room, and review of any pain management appointments or procedures.    History of present illness:  42-year-old female, presents to clinic today for complaints of left wrist pain.  She has had left wrist pain for several months.  Back in August, August 26 she had an emergency department visit after a fall where she fell onto her left arm and was noted to have hand and wrist pain than as well as well as a left elbow contusion.  Painful range of motion.    She also has acute left knee pain after a fall yesterday.  Was seen and evaluated in the emergency department yesterday for the same.      Review of Systems:    Constitutional: Negative for chills, fever and weight loss.   HENT: Negative for congestion.    Eyes: Negative for discharge and redness.   Respiratory: Negative for cough and shortness of breath.    Cardiovascular: Negative for chest pain.   Gastrointestinal: Negative for nausea and vomiting.   Musculoskeletal: See HPI.   Skin: Negative for rash.   Neurological: Negative for headaches.   Endo/Heme/Allergies: Does not bruise/bleed easily.   Psychiatric/Behavioral: The patient is not nervous/anxious.    All other systems reviewed and are negative.       Objective:      Physical Examination:    Vital Signs:  There were no vitals filed for this visit.    Body mass index is 47.55 kg/m².    This a well-developed, well nourished patient in no acute distress.  They are alert and oriented and cooperative to examination.     Patient is morbidly obese, observed to ambulate with an antalgic gait.      Pertinent New Results:    Patient with multiple studies or reports available for review.  She has a:    X-ray of hand and forearm dated August 26, 2022 reviewed by radiology both as negative  for acute fracture dislocation unremarkable soft tissue.    CT scan of the left wrist dated August 29, 2022 reviewed by radiology with a report that states no acute bony abnormality.    EMG and nerve conduction study dated September 22, 2022 reviewed by radiology with a report that demonstrates moderate bilateral carpal tunnel syndrome worse on the left than the right.    MRI of the left wrist dated October 22, 2022 and reviewed by radiology with a report that shows moderately enlarged and edematous median nerve within the carpal tunnel with findings suggestive of carpal tunnel syndrome.  Mild fraying of the central scapholunate ligament and negative for scaphoid fracture.    XRAY Report / Interpretation:   AP lateral oblique views of the left hand taken today in the office show no acute osseous abnormalities.  Visualized soft tissues appear normal.    AP lateral sunrise views of the left knee taken today in the office do not demonstrate any significant osseous abnormalities.  Visualized soft tissues appear normal.          Assessment:       1. Multiple falls    2. Fall as cause of accidental injury in home as place of occurrence, initial encounter    3. Contusion of left knee and lower leg, initial encounter    4. Chronic pain of left wrist    5. Carpal tunnel syndrome of left wrist    6. Carpal tunnel syndrome of right wrist      Plan:     Multiple falls  -     Cancel: X-Ray Wrist Complete Left    Fall as cause of accidental injury in home as place of occurrence, initial encounter    Contusion of left knee and lower leg, initial encounter  -     X-Ray Knee 3 View Left    Chronic pain of left wrist    Carpal tunnel syndrome of left wrist    Carpal tunnel syndrome of right wrist        Follow up for surgery.    42-year-old female who presents to clinic today for multiple complaints.  Her chief complaint is chronic left wrist pain.  She has multiple imaging studies and diagnostic test which demonstrate bilateral left  greater than right carpal tunnel syndrome.  Offered her carpal tunnel release, see if this will improve her overall symptoms.  Pain numbness and tingling.  We offered no guarantees but I think she will have a good result.    Patient was consented for surgery. Risks and benefits of the procedure were discussed in great detail. Complications may include but are not limited to bleeding, infection, DVT, nerve injury (recurrent branch of the median nerve), altered sensation, continued pain, pillar pain, RSD, loss of motion or a need for additional surgery.    In terms of the left knee, she had an acute fall yesterday.  X-rays are negative.  Will just see if this trauma from the fall settles down and how her knee does before we do any sort of intervention.      REN Banda, PA-C    This note was created using [employer] voice recognition software that occasionally misinterprets words or phrases.

## 2023-01-11 ENCOUNTER — TELEPHONE (OUTPATIENT)
Dept: ORTHOPEDICS | Facility: CLINIC | Age: 43
End: 2023-01-11

## 2023-01-11 NOTE — TELEPHONE ENCOUNTER
----- Message from Shoshana Young sent at 1/11/2023  2:04 PM CST -----  Pt  would  like a  call  back  asap  before  her  surgery  on  the 18th.

## 2023-01-12 ENCOUNTER — PATIENT MESSAGE (OUTPATIENT)
Dept: ADMINISTRATIVE | Facility: OTHER | Age: 43
End: 2023-01-12
Payer: COMMERCIAL

## 2023-01-13 ENCOUNTER — HOSPITAL ENCOUNTER (OUTPATIENT)
Dept: RADIOLOGY | Facility: HOSPITAL | Age: 43
Discharge: HOME | End: 2023-01-13
Attending: ORTHOPAEDIC SURGERY
Payer: COMMERCIAL

## 2023-01-13 ENCOUNTER — HOSPITAL ENCOUNTER (OUTPATIENT)
Dept: PREADMISSION TESTING | Facility: HOSPITAL | Age: 43
Discharge: HOME | End: 2023-01-13
Attending: ORTHOPAEDIC SURGERY
Payer: COMMERCIAL

## 2023-01-13 VITALS
WEIGHT: 260 LBS | DIASTOLIC BLOOD PRESSURE: 74 MMHG | OXYGEN SATURATION: 98 % | BODY MASS INDEX: 47.84 KG/M2 | TEMPERATURE: 98 F | HEART RATE: 86 BPM | SYSTOLIC BLOOD PRESSURE: 107 MMHG | RESPIRATION RATE: 18 BRPM | HEIGHT: 62 IN

## 2023-01-13 DIAGNOSIS — Z01.818 PRE-OP TESTING: Primary | ICD-10-CM

## 2023-01-13 DIAGNOSIS — G56.02 CARPAL TUNNEL SYNDROME OF LEFT WRIST: ICD-10-CM

## 2023-01-13 LAB
ALBUMIN SERPL BCP-MCNC: 4 G/DL (ref 3.5–5.2)
ALP SERPL-CCNC: 48 U/L (ref 55–135)
ALT SERPL W/O P-5'-P-CCNC: 27 U/L (ref 10–44)
ANION GAP SERPL CALC-SCNC: 13 MMOL/L (ref 8–16)
AST SERPL-CCNC: 22 U/L (ref 10–40)
BASOPHILS # BLD AUTO: 0.07 K/UL (ref 0–0.2)
BASOPHILS NFR BLD: 0.6 % (ref 0–1.9)
BILIRUB SERPL-MCNC: 0.5 MG/DL (ref 0.1–1)
BUN SERPL-MCNC: 16 MG/DL (ref 6–20)
CALCIUM SERPL-MCNC: 9 MG/DL (ref 8.7–10.5)
CHLORIDE SERPL-SCNC: 101 MMOL/L (ref 95–110)
CO2 SERPL-SCNC: 23 MMOL/L (ref 23–29)
CREAT SERPL-MCNC: 0.7 MG/DL (ref 0.5–1.4)
DIFFERENTIAL METHOD: ABNORMAL
EOSINOPHIL # BLD AUTO: 0.2 K/UL (ref 0–0.5)
EOSINOPHIL NFR BLD: 2.1 % (ref 0–8)
ERYTHROCYTE [DISTWIDTH] IN BLOOD BY AUTOMATED COUNT: 14 % (ref 11.5–14.5)
EST. GFR  (NO RACE VARIABLE): >60 ML/MIN/1.73 M^2
GLUCOSE SERPL-MCNC: 200 MG/DL (ref 70–110)
HCT VFR BLD AUTO: 42.7 % (ref 37–48.5)
HGB BLD-MCNC: 13.7 G/DL (ref 12–16)
IMM GRANULOCYTES # BLD AUTO: 0.1 K/UL (ref 0–0.04)
IMM GRANULOCYTES NFR BLD AUTO: 0.9 % (ref 0–0.5)
LYMPHOCYTES # BLD AUTO: 3.8 K/UL (ref 1–4.8)
LYMPHOCYTES NFR BLD: 34.3 % (ref 18–48)
MCH RBC QN AUTO: 28.5 PG (ref 27–31)
MCHC RBC AUTO-ENTMCNC: 32.1 G/DL (ref 32–36)
MCV RBC AUTO: 89 FL (ref 82–98)
MONOCYTES # BLD AUTO: 0.4 K/UL (ref 0.3–1)
MONOCYTES NFR BLD: 3.9 % (ref 4–15)
NEUTROPHILS # BLD AUTO: 6.4 K/UL (ref 1.8–7.7)
NEUTROPHILS NFR BLD: 58.2 % (ref 38–73)
NRBC BLD-RTO: 0 /100 WBC
PLATELET # BLD AUTO: 369 K/UL (ref 150–450)
PMV BLD AUTO: 9.9 FL (ref 9.2–12.9)
POTASSIUM SERPL-SCNC: 4.3 MMOL/L (ref 3.5–5.1)
PROT SERPL-MCNC: 7.1 G/DL (ref 6–8.4)
RBC # BLD AUTO: 4.81 M/UL (ref 4–5.4)
SODIUM SERPL-SCNC: 137 MMOL/L (ref 136–145)
WBC # BLD AUTO: 11.03 K/UL (ref 3.9–12.7)

## 2023-01-13 PROCEDURE — 80053 COMPREHEN METABOLIC PANEL: CPT | Performed by: ORTHOPAEDIC SURGERY

## 2023-01-13 PROCEDURE — 71046 X-RAY EXAM CHEST 2 VIEWS: CPT | Mod: TC

## 2023-01-13 PROCEDURE — 85025 COMPLETE CBC W/AUTO DIFF WBC: CPT | Performed by: ORTHOPAEDIC SURGERY

## 2023-01-13 PROCEDURE — 93010 EKG 12-LEAD: ICD-10-PCS | Mod: ,,, | Performed by: SPECIALIST

## 2023-01-13 PROCEDURE — 93010 ELECTROCARDIOGRAM REPORT: CPT | Mod: ,,, | Performed by: SPECIALIST

## 2023-01-13 PROCEDURE — 93005 ELECTROCARDIOGRAM TRACING: CPT | Performed by: SPECIALIST

## 2023-01-13 NOTE — PRE-PROCEDURE INSTRUCTIONS
Pre op instructions given . NPO after midnight.  Advised not to take metformin am of surgery - States will not take anything am of surgery - takes metoprolol at night, Questions answered and verbalized understanding.

## 2023-01-13 NOTE — DISCHARGE INSTRUCTIONS
To confirm, Your doctor has instructed you that surgery is scheduled for: Jan 18     Pre-Op will call the afternoon prior to surgery between 4:00 and 6:00 PM with the final arrival time.       1 Person can come with you the day of surgery.    Please park in the Garage Parking and come through front entrance.      GO TO REGISTRATION     After registration, proceed past gift shop and through glass door ( [facility]) Check in at the nurses station to the left.   Do not eat or drink anything after midnight the night before your surgery - THIS INCLUDES  WATER, GUM, MINTS AND CANDY.  YOU MAY BRUSH YOUR TEETH BUT DO NOT SWALLOW     TAKE ONLY THESE MEDICATIONS WITH A SMALL SIP OF WATER THE MORNING OF YOUR PROCEDURE: NONE      ONLY if you are diabetic, check your sugar in the morning before your procedure.       Do not take any diabetic medicines or insulin the morning of surgery .     PLEASE NOTE:  The surgery schedule has many variables which may affect the time of your surgery case.  Family members should be available if your surgery time changes.  Plan to be here the day of your procedure between 4-6 hours.      DO NOT TAKE THESE MEDICATIONS 5-7 DAYS PRIOR to your procedure or per your surgeon's request: ASPIRIN, ALEVE, ADVIL, IBUPROFEN,  MITZI SELTZER, BC , FISH OIL , VITAMIN E, HERBALS  (May take Tylenol)                                                      IMPORTANT INSTRUCTIONS      Do not smoke, vape or drink alcoholic beverages 24 hours prior to your procedure.  Shower the night before AND the morning of your procedure with a Chlorhexidine wash such as Hibiclens or Dial antibacterial soap from the neck down.   No lotions, powder or oils on your skin after you shower. DO NOT WEAR DEODORANT   Do not get it on your face or in your eyes.  You may use your own shampoo and face wash. This helps your skin to be as bacteria free as possible.    DO NOT remove hair from the surgery site.  Do not shave the incision  site unless you are given specific instructions to do so.    Sleep in a bed with clean sheets.    Do not sleep with a pet in the bed.   If you wear contact lenses, dentures, hearing aids or glasses, bring a container to put them in during surgery and give to a family member for safe keeping.    Please leave all jewelry, piercing's and valuables at home.   Wear rubber soled shoes (no flip flops).    If your doctor has scheduled you for an overnight stay, bring a small overnight bag with any personal items you need.    Make arrangements in advance for transportation home by a responsible adult.      You must make arrangements for transportation, TAXI'S, UBER'S OR LYFTS ARE NOT ALLOWED.        If you have any questions about these instructions, call (Monday - Friday) Pre-Op Admit  Nursing  at [phone] or the Pre-Op Day Surgery Unit at [phone].

## 2023-01-18 ENCOUNTER — ANESTHESIA EVENT (OUTPATIENT)
Dept: SURGERY | Facility: HOSPITAL | Age: 43
End: 2023-01-18
Payer: COMMERCIAL

## 2023-01-18 ENCOUNTER — ANESTHESIA (OUTPATIENT)
Dept: SURGERY | Facility: HOSPITAL | Age: 43
End: 2023-01-18
Payer: COMMERCIAL

## 2023-01-18 ENCOUNTER — HOSPITAL ENCOUNTER (OUTPATIENT)
Facility: HOSPITAL | Age: 43
Discharge: HOME | End: 2023-01-18
Attending: ORTHOPAEDIC SURGERY | Admitting: ORTHOPAEDIC SURGERY
Payer: COMMERCIAL

## 2023-01-18 VITALS
DIASTOLIC BLOOD PRESSURE: 62 MMHG | SYSTOLIC BLOOD PRESSURE: 115 MMHG | RESPIRATION RATE: 16 BRPM | HEART RATE: 73 BPM | TEMPERATURE: 98 F | BODY MASS INDEX: 47.84 KG/M2 | OXYGEN SATURATION: 96 % | HEIGHT: 62 IN | WEIGHT: 260 LBS

## 2023-01-18 DIAGNOSIS — G56.02 CARPAL TUNNEL SYNDROME OF LEFT WRIST: ICD-10-CM

## 2023-01-18 DIAGNOSIS — G56.02 CARPAL TUNNEL SYNDROME OF LEFT WRIST: Primary | ICD-10-CM

## 2023-01-18 DIAGNOSIS — G56.02 CARPAL TUNNEL SYNDROME ON LEFT: Primary | ICD-10-CM

## 2023-01-18 LAB
GLUCOSE SERPL-MCNC: 144 MG/DL (ref 70–110)
GLUCOSE SERPL-MCNC: 146 MG/DL (ref 70–110)

## 2023-01-18 PROCEDURE — 82962 GLUCOSE BLOOD TEST: CPT | Performed by: ORTHOPAEDIC SURGERY

## 2023-01-18 PROCEDURE — 37000009 HC ANESTHESIA EA ADD 15 MINS: Performed by: ORTHOPAEDIC SURGERY

## 2023-01-18 PROCEDURE — 36000707: Performed by: ORTHOPAEDIC SURGERY

## 2023-01-18 PROCEDURE — 63600175 PHARM REV CODE 636 W HCPCS: Performed by: ANESTHESIOLOGY

## 2023-01-18 PROCEDURE — 25000003 PHARM REV CODE 250: Performed by: NURSE ANESTHETIST, CERTIFIED REGISTERED

## 2023-01-18 PROCEDURE — 71000033 HC RECOVERY, INTIAL HOUR: Performed by: ORTHOPAEDIC SURGERY

## 2023-01-18 PROCEDURE — 71000015 HC POSTOP RECOV 1ST HR: Performed by: ORTHOPAEDIC SURGERY

## 2023-01-18 PROCEDURE — 63600175 PHARM REV CODE 636 W HCPCS: Performed by: NURSE ANESTHETIST, CERTIFIED REGISTERED

## 2023-01-18 PROCEDURE — 36000706: Performed by: ORTHOPAEDIC SURGERY

## 2023-01-18 PROCEDURE — 63600175 PHARM REV CODE 636 W HCPCS: Performed by: ORTHOPAEDIC SURGERY

## 2023-01-18 PROCEDURE — 27201423 OPTIME MED/SURG SUP & DEVICES STERILE SUPPLY: Performed by: ORTHOPAEDIC SURGERY

## 2023-01-18 PROCEDURE — 37000008 HC ANESTHESIA 1ST 15 MINUTES: Performed by: ORTHOPAEDIC SURGERY

## 2023-01-18 RX ORDER — FENTANYL CITRATE 50 UG/ML
25 INJECTION, SOLUTION INTRAMUSCULAR; INTRAVENOUS EVERY 5 MIN PRN
Status: DISCONTINUED | OUTPATIENT
Start: 2023-01-18 | End: 2023-01-18 | Stop reason: HOSPADM

## 2023-01-18 RX ORDER — TRAMADOL HYDROCHLORIDE 50 MG/1
50 TABLET ORAL ONCE AS NEEDED
Status: DISCONTINUED | OUTPATIENT
Start: 2023-01-18 | End: 2023-01-18 | Stop reason: HOSPADM

## 2023-01-18 RX ORDER — ONDANSETRON 2 MG/ML
4 INJECTION INTRAMUSCULAR; INTRAVENOUS DAILY PRN
Status: DISCONTINUED | OUTPATIENT
Start: 2023-01-18 | End: 2023-01-18 | Stop reason: HOSPADM

## 2023-01-18 RX ORDER — DIPHENHYDRAMINE HYDROCHLORIDE 50 MG/ML
6.25 INJECTION INTRAMUSCULAR; INTRAVENOUS ONCE AS NEEDED
Status: DISCONTINUED | OUTPATIENT
Start: 2023-01-18 | End: 2023-01-18 | Stop reason: HOSPADM

## 2023-01-18 RX ORDER — PROPOFOL 10 MG/ML
INJECTION, EMULSION INTRAVENOUS
Status: DISCONTINUED | OUTPATIENT
Start: 2023-01-18 | End: 2023-01-18

## 2023-01-18 RX ORDER — HYDROCODONE BITARTRATE AND ACETAMINOPHEN 7.5; 325 MG/1; MG/1
1 TABLET ORAL EVERY 6 HOURS PRN
Qty: 14 TABLET | Refills: 0 | Status: SHIPPED | OUTPATIENT
Start: 2023-01-18 | End: 2023-01-25

## 2023-01-18 RX ORDER — CEFAZOLIN SODIUM 2 G/50ML
2 SOLUTION INTRAVENOUS
Status: COMPLETED | OUTPATIENT
Start: 2023-01-18 | End: 2023-01-18

## 2023-01-18 RX ORDER — FAMOTIDINE 10 MG/ML
INJECTION INTRAVENOUS
Status: DISCONTINUED | OUTPATIENT
Start: 2023-01-18 | End: 2023-01-18

## 2023-01-18 RX ORDER — ACETAMINOPHEN 10 MG/ML
INJECTION, SOLUTION INTRAVENOUS
Status: DISCONTINUED | OUTPATIENT
Start: 2023-01-18 | End: 2023-01-18

## 2023-01-18 RX ORDER — ONDANSETRON 2 MG/ML
INJECTION INTRAMUSCULAR; INTRAVENOUS
Status: DISCONTINUED | OUTPATIENT
Start: 2023-01-18 | End: 2023-01-18

## 2023-01-18 RX ORDER — MIDAZOLAM HYDROCHLORIDE 1 MG/ML
INJECTION INTRAMUSCULAR; INTRAVENOUS
Status: DISCONTINUED | OUTPATIENT
Start: 2023-01-18 | End: 2023-01-18

## 2023-01-18 RX ADMIN — ACETAMINOPHEN 75 MG: 10 INJECTION, SOLUTION INTRAVENOUS at 10:01

## 2023-01-18 RX ADMIN — MIDAZOLAM HYDROCHLORIDE 1 MG: 1 INJECTION, SOLUTION INTRAMUSCULAR; INTRAVENOUS at 10:01

## 2023-01-18 RX ADMIN — FENTANYL CITRATE 25 MCG: 50 INJECTION INTRAMUSCULAR; INTRAVENOUS at 11:01

## 2023-01-18 RX ADMIN — ONDANSETRON 4 MG: 2 INJECTION INTRAMUSCULAR; INTRAVENOUS at 10:01

## 2023-01-18 RX ADMIN — FAMOTIDINE 20 MG: 10 INJECTION, SOLUTION INTRAVENOUS at 10:01

## 2023-01-18 RX ADMIN — CEFAZOLIN SODIUM 2 G: 2 SOLUTION INTRAVENOUS at 10:01

## 2023-01-18 RX ADMIN — PROPOFOL 200 MG: 10 INJECTION, EMULSION INTRAVENOUS at 10:01

## 2023-01-18 RX ADMIN — ACETAMINOPHEN 1000 MG: 10 INJECTION, SOLUTION INTRAVENOUS at 10:01

## 2023-01-18 RX ADMIN — SODIUM CHLORIDE, SODIUM LACTATE, POTASSIUM CHLORIDE, AND CALCIUM CHLORIDE: .6; .31; .03; .02 INJECTION, SOLUTION INTRAVENOUS at 10:01

## 2023-01-18 NOTE — OP NOTE
[facility]  Surgery Department  Operative Note    SUMMARY     Date of Procedure: 1/18/2023     Procedure:  Carpal tunnel release left    Surgeon(s) and Role:     * Alexi Ayoub MD - Primary    Assisting Surgeon:  Liz    Pre-Operative Diagnosis: Carpal tunnel syndrome of left wrist [G56.02]    Post-Operative Diagnosis: Post-Op Diagnosis Codes:     * Carpal tunnel syndrome of left wrist [G56.02]    Anesthesia: Local MAC    Intraoperative Findings:  Left carpal tunnel syndrome    Description of the Findings of the Procedure: Patient was placed on the operative table in supine position.  The left hand was prepped and draped in the usual sterile manner for surgery.  An incision was made in line with the 3rd ray.  It was carried down sharply through the skin.  The longitudinal fibers the palmaris fascia visualized and divided.  The transverse carpal ligament was easily seen.  A small rent was made in the transverse carpal ligament and hemostat was slipped under the transverse carpal ligament.  A knife was now used to cut down onto the transverse carpal ligament.  We now divided the most proximal and distal aspects of the transverse carpal ligament using Metzenbaum scissors.  All cutting was done on the ulnar border of the nerve we cut only what we could directly visualized.  We copiously irrigated.  The skin was closed with 4-0 nylon sutures.  Sterile dressings were applied and the patient was noted to be in stable condition.     Complications: No    Estimated Blood Loss (EBL): * No values recorded between 1/18/2023 12:00 AM and 1/18/2023 10:49 AM *           Implants: * No implants in log *    Specimens:   Specimen (24h ago, onward)      None                    Condition: Good    Disposition: PACU - hemodynamically stable.              Discharge Note    SUMMARY     Admit Date: 1/18/2023    Discharge Date and Time:  01/18/2023 10:49 AM    Hospital Course (synopsis of major diagnoses, care, treatment,  and services provided during the course of the hospital stay): Uneventful      Final Diagnosis: Post-Op Diagnosis Codes:     * Carpal tunnel syndrome of left wrist [G56.02]    Disposition: Home or Self Care    Follow Up/Patient Instructions:     Medications:  Reconciled Home Medications:   Current Discharge Medication List        CONTINUE these medications which have NOT CHANGED    Details   calcium carbonate/vitamin D3 (CALCIUM 500 + D ORAL) Take 1 tablet by mouth Daily.      citalopram (CELEXA) 40 MG tablet Take 40 mg by mouth once daily.       gabapentin (NEURONTIN) 300 MG capsule Take 600 mg by mouth 3 (three) times daily.       hydrOXYzine HCL (ATARAX) 10 MG Tab Take 10 mg by mouth 2 (two) times daily.      metformin (GLUCOPHAGE-XR) 500 MG 24 hr tablet Take 500 mg by mouth 2 (two) times daily with meals.      metoprolol succinate (TOPROL-XL) 25 MG 24 hr tablet Take 25 mg by mouth.      MULTIVITAMIN ORAL Take 1 tablet by mouth Daily.      naproxen (NAPROSYN) 375 MG tablet Take 375 mg by mouth.      ondansetron (ZOFRAN-ODT) 4 MG TbDL Take 1 tablet by mouth every 8 (eight) hours as needed.      pantoprazole (PROTONIX) 40 MG tablet Take 40 mg by mouth.      HYDROcodone-acetaminophen (NORCO) 7.5-325 mg per tablet Take 1 tablet by mouth every 6 (six) hours as needed for Pain.  Qty: 14 tablet, Refills: 0    Comments: Quantity prescribed more than 7 day supply? No  Associated Diagnoses: Carpal tunnel syndrome of left wrist      pseudoephedrine (SUDAFED) 30 MG tablet Take 30 mg by mouth every 6 (six) hours as needed.           Discharge Procedure Orders   Diet Adult Regular     Leave dressing on - Keep it clean, dry, and intact until clinic visit     Activity as tolerated

## 2023-01-18 NOTE — ANESTHESIA POSTPROCEDURE EVALUATION
Anesthesia Post Evaluation    Patient: Jennie Harmon    Procedure(s) Performed: Procedure(s) (LRB):  RELEASE, CARPAL TUNNEL, LEFT (Left)    Final Anesthesia Type: regional      Patient location during evaluation: PACU  Patient participation: Yes- Able to Participate  Level of consciousness: awake and alert and oriented  Post-procedure vital signs: reviewed and stable  Pain management: adequate  Airway patency: patent    PONV status at discharge: No PONV  Anesthetic complications: no      Cardiovascular status: blood pressure returned to baseline, hemodynamically stable and stable  Respiratory status: unassisted, spontaneous ventilation and room air  Hydration status: euvolemic  Follow-up not needed.          Vitals Value Taken Time   /69 01/18/23 1130   Temp 36.7 01/18/23 1147   Pulse 75 01/18/23 1140   Resp 20 01/18/23 1137   SpO2 95 % 01/18/23 1140   Vitals shown include unvalidated device data.      Event Time   Out of Recovery 11:36:00         Pain/Yaneli Score: Pain Rating Prior to Med Admin: 5 (1/18/2023 11:14 AM)  Yaneli Score: 8 (1/18/2023 11:03 AM)

## 2023-01-18 NOTE — H&P
CC/Indication for Procedure: 42 y.o. female with Carpal tunnel syndrome of left wrist [G56.02].    Patient scheduled for DE [facility] MEDIAN N/CARPAL TUNNEL SURG [[id number]] (RELEASE, CARPAL TUNNEL, LEFT).    Past Medical History:   Diagnosis Date    Anxiety     Carpal tunnel syndrome 08/2022    CHF (congestive heart failure) 08/2022    after sepsis from kidney stones  - [facility]    Depression     Diabetes mellitus 2008    Glaucoma     Goiter     Hx of oral aphthous ulcers     Portal hypertension     Sepsis, unspecified organism 08/2022    after kidney stones -     Past Surgical History:   Procedure Laterality Date    APPENDECTOMY      CYSTOSCOPY W/ URETERAL STENT PLACEMENT      FOREIGN BODY REMOVAL      foot    TONSILLECTOMY      uterine ablation       No family history on file.  Social History     Socioeconomic History    Marital status:    Tobacco Use    Smoking status: Never   Substance and Sexual Activity    Alcohol use: No    Drug use: No    Sexual activity: Yes     Partners: Male       Review of patient's allergies indicates:   Allergen Reactions    Oxycodone Itching       No current facility-administered medications for this encounter.    Current Outpatient Medications:     calcium carbonate/vitamin D3 (CALCIUM 500 + D ORAL), Take 1 tablet by mouth Daily., Disp: , Rfl:     citalopram (CELEXA) 40 MG tablet, Take 40 mg by mouth once daily. , Disp: , Rfl:     dicyclomine (BENTYL) 10 MG capsule, Take 10 mg by mouth., Disp: , Rfl:     fluticasone propionate (FLONASE) 50 mcg/actuation nasal spray, 2 sprays by Nasal route., Disp: , Rfl:     gabapentin (NEURONTIN) 300 MG capsule, Take 600 mg by mouth 3 (three) times daily. , Disp: , Rfl:     hydrOXYzine HCL (ATARAX) 10 MG Tab, Take 10 mg by mouth 2 (two) times daily., Disp: , Rfl:     metformin (GLUCOPHAGE-XR) 500 MG 24 hr tablet, Take 500 mg by mouth 2 (two) times daily with meals., Disp: , Rfl:     metoprolol succinate (TOPROL-XL) 25 MG 24 hr  tablet, Take 25 mg by mouth., Disp: , Rfl:     MULTIVITAMIN ORAL, Take 1 tablet by mouth Daily., Disp: , Rfl:     naproxen (NAPROSYN) 375 MG tablet, Take 375 mg by mouth., Disp: , Rfl:     nitrofurantoin, macrocrystal-monohydrate, (MACROBID) 100 MG capsule, Take 100 mg by mouth 2 (two) times daily., Disp: , Rfl:     ondansetron (ZOFRAN-ODT) 4 MG TbDL, Take 1 tablet by mouth every 8 (eight) hours as needed., Disp: , Rfl:     pantoprazole (PROTONIX) 40 MG tablet, Take 40 mg by mouth., Disp: , Rfl:     pseudoephedrine (SUDAFED) 30 MG tablet, Take 30 mg by mouth every 6 (six) hours as needed., Disp: , Rfl:     ROS:    Denies chest pain or palpitations  Denies shortness of breath  Denies fevers or chills  Denies chest pain  Denies abdominal pain    PE:    General Appearance: Well nourished  Orientation: Oriented to time, place, person  Mental Status: Alert  Heart: RRR  Lungs: CTA  Abdomen: Soft and non-tender    Anesthesia/Surgery risks, benefits and alternative options discussed and understood by patient/family.    This note was created using Dragon voice recognition software that occasionally misinterpreted phrases or words.

## 2023-01-18 NOTE — ANESTHESIA PREPROCEDURE EVALUATION
01/18/2023  Jennie Harmon is a 42 y.o., female.    Patient Active Problem List   Diagnosis    Indigestion    Type 2 diabetes mellitus with hyperglycemia, without long-term current use of insulin    Multinodular goiter    Class 3 severe obesity due to excess calories with serious comorbidity and body mass index (BMI) of 45.0 to 49.9 in adult       Past Surgical History:   Procedure Laterality Date    APPENDECTOMY      CYSTOSCOPY W/ URETERAL STENT PLACEMENT      FOREIGN BODY REMOVAL      foot    TONSILLECTOMY      uterine ablation          Tobacco Use:  The patient  reports that she has never smoked. She does not have any smokeless tobacco history on file.     Results for orders placed or performed during the hospital encounter of 01/13/23   EKG 12-lead    Collection Time: 01/13/23  7:16 AM    Narrative    Test Reason : Z01.818,    Vent. Rate : 081 BPM     Atrial Rate : 081 BPM     P-R Int : 154 ms          QRS Dur : 086 ms      QT Int : 422 ms       P-R-T Axes : 043 051 048 degrees     QTc Int : 490 ms    Normal sinus rhythm  Normal ECG  When compared with ECG of 24-OCT-2017 06:31,  T wave amplitude has decreased in Lateral leads  Confirmed by Paxton BATES, Phan MENDEZ ([id number]) on 1/16/2023 8:32:26 PM    Referred By:             Confirmed By:Phan Matta MD             Lab Results   Component Value Date    WBC 11.03 01/13/2023    HGB 13.7 01/13/2023    HCT 42.7 01/13/2023    MCV 89 01/13/2023     01/13/2023     BMP  Lab Results   Component Value Date     01/13/2023    K 4.3 01/13/2023     01/13/2023    CO2 23 01/13/2023    BUN 16 01/13/2023    CREATININE 0.7 01/13/2023    CALCIUM 9.0 01/13/2023    ANIONGAP 13 01/13/2023     (H) 01/13/2023     (H) 10/24/2017     10/15/2016       No results found for this or any previous visit.            Pre-op Assessment    I have  reviewed the Patient Summary Reports.     I have reviewed the Nursing Notes. I have reviewed the NPO Status.   I have reviewed the Medications.     Review of Systems  Anesthesia Hx:  No problems with previous Anesthesia  Denies Family Hx of Anesthesia complications.   Denies Personal Hx of Anesthesia complications.   Social:  Non-Smoker    Hematology/Oncology:  Hematology Normal        EENT/Dental:  EENT/Dental Normal Eyes: Eye Disease: Glaucoma:   Glaucoma   Cardiovascular:   CHF ECG has been reviewed.    Pulmonary:  Pulmonary Normal    Renal/:  Renal/ Normal     Hepatic/GI:  Hepatic/GI Normal    Musculoskeletal:  Musculoskeletal Normal    Neurological:   Neuromuscular Disease,    Endocrine:   Diabetes, well controlled, type 2 Goiter Morbid Obesity / BMI > 40  Psych:   Psychiatric History anxiety depression          Physical Exam  General: Well nourished, Cooperative, Alert and Oriented    Airway:  Mallampati: II   Mouth Opening: Normal  TM Distance: Normal  Tongue: Normal  Neck ROM: Normal ROM    Dental:  Intact    Chest/Lungs:  Clear to auscultation, Normal Respiratory Rate    Heart:  Rate: Normal  Rhythm: Regular Rhythm        Anesthesia Plan  Type of Anesthesia, risks & benefits discussed:    Anesthesia Type: Gen Supraglottic Airway  Intra-op Monitoring Plan: Standard ASA Monitors  Post Op Pain Control Plan: IV/PO Opioids PRN and multimodal analgesia  Informed Consent: Informed consent signed with the Patient and all parties understand the risks and agree with anesthesia plan.  All questions answered.   ASA Score: 3  Anesthesia Plan Notes:     General LMA  No decadron  Benadryl 6.25 mg iv, Zofran 4 mg iv, Pepcid 20 mg iv   Ofirmev 1000 mg iv    Ready For Surgery From Anesthesia Perspective.     .

## 2023-01-18 NOTE — TRANSFER OF CARE
"Anesthesia Transfer of Care Note    Patient: Jennie Harmon    Procedure(s) Performed: Procedure(s) (LRB):  RELEASE, CARPAL TUNNEL, LEFT (Left)    Patient location: PACU    Anesthesia Type: general    Transport from OR: Transported from OR on 2-3 L/min O2 by NC with adequate spontaneous ventilation    Post pain: adequate analgesia    Post assessment: no apparent anesthetic complications    Post vital signs: stable    Level of consciousness: awake    Nausea/Vomiting: no nausea/vomiting    Complications: none    Transfer of care protocol was followed      Last vitals:   Visit Vitals  /80 (BP Location: Left arm, Patient Position: Sitting)   Pulse 81   Temp 36.6 °C (97.8 °F) (Oral)   Resp 18   Ht 5' 2" (1.575 m)   Wt 117.9 kg (260 lb)   SpO2 96%   Breastfeeding No   BMI 47.55 kg/m²     "

## 2023-01-19 ENCOUNTER — OFFICE VISIT (OUTPATIENT)
Dept: ORTHOPEDICS | Facility: CLINIC | Age: 43
End: 2023-01-19
Payer: COMMERCIAL

## 2023-01-19 VITALS — BODY MASS INDEX: 47.84 KG/M2 | WEIGHT: 260 LBS | HEIGHT: 62 IN

## 2023-01-19 DIAGNOSIS — Z98.890 STATUS POST CARPAL TUNNEL RELEASE: Primary | ICD-10-CM

## 2023-01-19 PROCEDURE — 99024 PR POST-OP FOLLOW-UP VISIT: ICD-10-PCS | Mod: S$GLB,,, | Performed by: ORTHOPAEDIC SURGERY

## 2023-01-19 PROCEDURE — 99024 POSTOP FOLLOW-UP VISIT: CPT | Mod: S$GLB,,, | Performed by: ORTHOPAEDIC SURGERY

## 2023-01-19 PROCEDURE — 3008F BODY MASS INDEX DOCD: CPT | Mod: CPTII,S$GLB,, | Performed by: ORTHOPAEDIC SURGERY

## 2023-01-19 PROCEDURE — 1159F PR MEDICATION LIST DOCUMENTED IN MEDICAL RECORD: ICD-10-PCS | Mod: CPTII,S$GLB,, | Performed by: ORTHOPAEDIC SURGERY

## 2023-01-19 PROCEDURE — 1160F PR REVIEW ALL MEDS BY PRESCRIBER/CLIN PHARMACIST DOCUMENTED: ICD-10-PCS | Mod: CPTII,S$GLB,, | Performed by: ORTHOPAEDIC SURGERY

## 2023-01-19 PROCEDURE — 1160F RVW MEDS BY RX/DR IN RCRD: CPT | Mod: CPTII,S$GLB,, | Performed by: ORTHOPAEDIC SURGERY

## 2023-01-19 PROCEDURE — 1159F MED LIST DOCD IN RCRD: CPT | Mod: CPTII,S$GLB,, | Performed by: ORTHOPAEDIC SURGERY

## 2023-01-19 PROCEDURE — 3008F PR BODY MASS INDEX (BMI) DOCUMENTED: ICD-10-PCS | Mod: CPTII,S$GLB,, | Performed by: ORTHOPAEDIC SURGERY

## 2023-01-19 RX ORDER — LORATADINE 10 MG/1
10 TABLET ORAL
COMMUNITY

## 2023-01-19 RX ORDER — GLIMEPIRIDE 2 MG/1
TABLET ORAL
COMMUNITY

## 2023-01-19 RX ORDER — CYCLOBENZAPRINE HCL 10 MG
10 TABLET ORAL
COMMUNITY
Start: 2022-12-29

## 2023-01-19 NOTE — PROGRESS NOTES
[facility]    Subjective:     Chief Complaint:   Chief Complaint   Patient presents with    Left Hand - Post-op Evaluation     POD1 Left CTR. States that she is doing well       Past Medical History:   Diagnosis Date    Anxiety     Carpal tunnel syndrome 08/2022    CHF (congestive heart failure) 08/2022    after sepsis from kidney stones  - [facility]    Depression     Diabetes mellitus 2008    Glaucoma     Goiter     Hx of oral aphthous ulcers     Portal hypertension     Sepsis, unspecified organism 08/2022    after kidney stones -       Past Surgical History:   Procedure Laterality Date    APPENDECTOMY      CARPAL TUNNEL RELEASE Left 1/18/2023    Procedure: RELEASE, CARPAL TUNNEL, LEFT;  Surgeon: Alexi Ayoub MD;  Location: [facility] OR;  Service: Orthopedics;  Laterality: Left;    CYSTOSCOPY W/ URETERAL STENT PLACEMENT      FOREIGN BODY REMOVAL      foot    TONSILLECTOMY      uterine ablation         Current Outpatient Medications   Medication Sig    calcium carbonate/vitamin D3 (CALCIUM 500 + D ORAL) Take 1 tablet by mouth Daily.    citalopram (CELEXA) 40 MG tablet Take 40 mg by mouth once daily.     cyclobenzaprine (FLEXERIL) 10 MG tablet Take 10 mg by mouth.    gabapentin (NEURONTIN) 300 MG capsule Take 600 mg by mouth 3 (three) times daily.     glimepiride (AMARYL) 2 MG tablet Take by mouth.    HYDROcodone-acetaminophen (NORCO) 7.5-325 mg per tablet Take 1 tablet by mouth every 6 (six) hours as needed for Pain.    hydrOXYzine HCL (ATARAX) 10 MG Tab Take 10 mg by mouth 2 (two) times daily.    loratadine (CLARITIN) 10 mg tablet Take 10 mg by mouth.    metformin (GLUCOPHAGE-XR) 500 MG 24 hr tablet Take 500 mg by mouth 2 (two) times daily with meals.    metoprolol succinate (TOPROL-XL) 25 MG 24 hr tablet Take 25 mg by mouth.    MULTIVITAMIN ORAL Take 1 tablet by mouth Daily.    naproxen (NAPROSYN) 375 MG tablet Take 375 mg by mouth.    ondansetron (ZOFRAN-ODT) 4 MG TbDL Take 1 tablet by  mouth every 8 (eight) hours as needed.    pantoprazole (PROTONIX) 40 MG tablet Take 40 mg by mouth.    pseudoephedrine (SUDAFED) 30 MG tablet Take 30 mg by mouth every 6 (six) hours as needed.     No current facility-administered medications for this visit.       Review of patient's allergies indicates:   Allergen Reactions    Oxycodone Itching       History reviewed. No pertinent family history.    Social History     Socioeconomic History    Marital status:    Tobacco Use    Smoking status: Never   Substance and Sexual Activity    Alcohol use: No    Drug use: No    Sexual activity: Yes     Partners: Male       History of present illness:  Patient comes in today postop day 1 left carpal tunnel release.  She comes in today for dressing check and evaluation of postoperative pain control.  She comes in today with her left upper extremity postoperative dressing in place.    Review of Systems:    Constitution: Negative for chills, fever, and sweats.  Negative for unexplained weight loss.    HENT:  Negative for headaches and blurry vision.    Cardiovascular:Negative for chest pain or irregular heart beat. Negative for hypertension.    Respiratory:  Negative for cough and shortness of breath.    Gastrointestinal: Negative for abdominal pain, heartburn, melena, nausea, and vomitting.    Genitourinary:  Negative bladder incontinence and dysuria.    Musculoskeletal:  See HPI for details.     Neurological: Negative for numbness.    Psychiatric/Behavioral: Negative for depression.  The patient is not nervous/anxious.      Endocrine: Negative for polyuria    Hematologic/Lymphatic: Negative for bleeding problem.  Does not bruise/bleed easily.    Skin: Negative for poor would healing and rash    Objective:      Physical Examination:    Vital Signs:  There were no vitals filed for this visit.    Body mass index is 47.55 kg/m².    This a well-developed, well nourished patient in no acute distress.  They are alert and oriented  "and cooperative to examination.        Left hand exam: Skin to the left hand is not exposed.  Dressing was left in place.  Capillary refill to all digits in her left hand is brisk and less than 2 seconds.  She can wiggle all digits in her left hand.  She does have ability to flex and extend all digits.  She does have the expected discomfort with doing so at 1 day postoperatively.  She is neurovascularly intact throughout the left upper extremity.    Pertinent New Results:    XRAY Report / Interpretation:   No new radiographs were taken on today's clinic visit.    Assessment/Plan:      1. Status post left carpal tunnel release.      Patient reports the pain medication is a bit strong for her.  She intends to continue with Motrin and Tylenol for pain control.  She will keep the pain medication to use as needed if the pain becomes too great.  She will follow up with us in 2 weeks for wound check and suture removal.  She is advised to clean the dressing and operative site clean and dry and intact.    Garry Deleon, Physician Assistant, served in the capacity as a "scribe" for this patient encounter.  A "face-to-face" encounter occurred with Dr. Alexi Ayoub on this date.  The treatment plan and medical decision-making is outlined above. Patient was seen and examined with a chaperone.       This note was created using Dragon voice recognition software that occasionally misinterpreted phrases or words.          "

## 2023-01-25 ENCOUNTER — OFFICE VISIT (OUTPATIENT)
Dept: ORTHOPEDICS | Facility: CLINIC | Age: 43
End: 2023-01-25
Payer: COMMERCIAL

## 2023-01-25 VITALS
DIASTOLIC BLOOD PRESSURE: 84 MMHG | BODY MASS INDEX: 47.84 KG/M2 | WEIGHT: 260 LBS | HEIGHT: 62 IN | SYSTOLIC BLOOD PRESSURE: 122 MMHG

## 2023-01-25 DIAGNOSIS — Z98.890 STATUS POST CARPAL TUNNEL RELEASE: Primary | ICD-10-CM

## 2023-01-25 PROCEDURE — 1159F PR MEDICATION LIST DOCUMENTED IN MEDICAL RECORD: ICD-10-PCS | Mod: CPTII,S$GLB,, | Performed by: PHYSICIAN ASSISTANT

## 2023-01-25 PROCEDURE — 3074F SYST BP LT 130 MM HG: CPT | Mod: CPTII,S$GLB,, | Performed by: PHYSICIAN ASSISTANT

## 2023-01-25 PROCEDURE — 3079F DIAST BP 80-89 MM HG: CPT | Mod: CPTII,S$GLB,, | Performed by: PHYSICIAN ASSISTANT

## 2023-01-25 PROCEDURE — 3008F BODY MASS INDEX DOCD: CPT | Mod: CPTII,S$GLB,, | Performed by: PHYSICIAN ASSISTANT

## 2023-01-25 PROCEDURE — 1159F MED LIST DOCD IN RCRD: CPT | Mod: CPTII,S$GLB,, | Performed by: PHYSICIAN ASSISTANT

## 2023-01-25 PROCEDURE — 3008F PR BODY MASS INDEX (BMI) DOCUMENTED: ICD-10-PCS | Mod: CPTII,S$GLB,, | Performed by: PHYSICIAN ASSISTANT

## 2023-01-25 PROCEDURE — 3074F PR MOST RECENT SYSTOLIC BLOOD PRESSURE < 130 MM HG: ICD-10-PCS | Mod: CPTII,S$GLB,, | Performed by: PHYSICIAN ASSISTANT

## 2023-01-25 PROCEDURE — 1160F RVW MEDS BY RX/DR IN RCRD: CPT | Mod: CPTII,S$GLB,, | Performed by: PHYSICIAN ASSISTANT

## 2023-01-25 PROCEDURE — 1160F PR REVIEW ALL MEDS BY PRESCRIBER/CLIN PHARMACIST DOCUMENTED: ICD-10-PCS | Mod: CPTII,S$GLB,, | Performed by: PHYSICIAN ASSISTANT

## 2023-01-25 PROCEDURE — 99024 PR POST-OP FOLLOW-UP VISIT: ICD-10-PCS | Mod: S$GLB,,, | Performed by: PHYSICIAN ASSISTANT

## 2023-01-25 PROCEDURE — 3079F PR MOST RECENT DIASTOLIC BLOOD PRESSURE 80-89 MM HG: ICD-10-PCS | Mod: CPTII,S$GLB,, | Performed by: PHYSICIAN ASSISTANT

## 2023-01-25 PROCEDURE — 99024 POSTOP FOLLOW-UP VISIT: CPT | Mod: S$GLB,,, | Performed by: PHYSICIAN ASSISTANT

## 2023-01-25 RX ORDER — TRAMADOL HYDROCHLORIDE 50 MG/1
50 TABLET ORAL EVERY 8 HOURS PRN
COMMUNITY
Start: 2023-01-24

## 2023-01-25 NOTE — PROGRESS NOTES
[facility]  [address]  [place] [address]  Phone: [phone]   [phone]    Patient's PCP:Pankaj Olivera NP  Referring Provider: No ref. provider found    POST-OP Note:    Subjective:        Chief Complaint:   Chief Complaint   Patient presents with    Left Hand - Post-op Evaluation     PO Left CTR 01/18/23. States that she developed a rash with under her bandage. Suture site is not bothering her.        Past Medical History:   Diagnosis Date    Anxiety     Carpal tunnel syndrome 08/2022    CHF (congestive heart failure) 08/2022    after sepsis from kidney stones  - [facility]    Depression     Diabetes mellitus 2008    Glaucoma     Goiter     Hx of oral aphthous ulcers     Portal hypertension     Sepsis, unspecified organism 08/2022    after kidney stones -       Past Surgical History:   Procedure Laterality Date    APPENDECTOMY      CARPAL TUNNEL RELEASE Left 1/18/2023    Procedure: RELEASE, CARPAL TUNNEL, LEFT;  Surgeon: Alexi Ayoub MD;  Location: [facility] OR;  Service: Orthopedics;  Laterality: Left;    CYSTOSCOPY W/ URETERAL STENT PLACEMENT      FOREIGN BODY REMOVAL      foot    TONSILLECTOMY      uterine ablation         Current Outpatient Medications   Medication Sig    calcium carbonate/vitamin D3 (CALCIUM 500 + D ORAL) Take 1 tablet by mouth Daily.    citalopram (CELEXA) 40 MG tablet Take 40 mg by mouth once daily.     cyclobenzaprine (FLEXERIL) 10 MG tablet Take 10 mg by mouth.    gabapentin (NEURONTIN) 300 MG capsule Take 600 mg by mouth 3 (three) times daily.     glimepiride (AMARYL) 2 MG tablet Take by mouth.    HYDROcodone-acetaminophen (NORCO) 7.5-325 mg per tablet Take 1 tablet by mouth every 6 (six) hours as needed for Pain.    hydrOXYzine HCL (ATARAX) 10 MG Tab Take 10 mg by mouth 2 (two) times daily.    loratadine (CLARITIN) 10 mg tablet Take 10 mg by mouth.    metformin (GLUCOPHAGE-XR) 500 MG 24 hr tablet Take 500 mg by mouth 2 (two) times daily  with meals.    metoprolol succinate (TOPROL-XL) 25 MG 24 hr tablet Take 25 mg by mouth.    MULTIVITAMIN ORAL Take 1 tablet by mouth Daily.    naproxen (NAPROSYN) 375 MG tablet Take 375 mg by mouth.    ondansetron (ZOFRAN-ODT) 4 MG TbDL Take 1 tablet by mouth every 8 (eight) hours as needed.    pantoprazole (PROTONIX) 40 MG tablet Take 40 mg by mouth.    pseudoephedrine (SUDAFED) 30 MG tablet Take 30 mg by mouth every 6 (six) hours as needed.    traMADoL (ULTRAM) 50 mg tablet Take 50 mg by mouth every 8 (eight) hours as needed.     No current facility-administered medications for this visit.       Review of patient's allergies indicates:   Allergen Reactions    Oxycodone Itching       History reviewed. No pertinent family history.    Social History     Socioeconomic History    Marital status:    Tobacco Use    Smoking status: Never   Substance and Sexual Activity    Alcohol use: No    Drug use: No    Sexual activity: Yes     Partners: Male       History of present illness:  Jennie comes in today 1 week status post left carpal tunnel release.  She reports she had some level of swelling and was concerned dressing was wet.  She also reports that she had an allergic type reaction due to the dressing about the left hand where she had some swelling and pruritus.  She took some Benadryl which has helped greatly.  However, she came in today just to be thorough and have the operative site examined.  She denies any difficulty breathing or shortness of breath.    Review of Systems:    Musculoskeletal:  See HPI       Objective:        Physical Examination:    Vital Signs:   Vitals:    01/25/23 1053   BP: 122/84       Body mass index is 47.55 kg/m².    This a well-developed, well nourished patient in no acute distress.  They are alert and oriented and cooperative to examination.        Left hand exam: Skin to her left hand is clean dry and intact.  There is no erythema or ecchymosis.  There are no signs or symptoms of  infection.  Left palmar incision consistent with carpal tunnel release is healing well without wound dehiscence or drainage.  She can open and close her left hand into a fist.  She can oppose her left thumb to her index finger without difficulty.  There is no evidence currently of any type of allergic reaction or dermatitis.    Pertinent New Results:     XRAY Report / Interpretation:  No new radiographic images were taken on today's clinic visit.     Assessment:       1. Status post carpal tunnel release      Plan:     Status post carpal tunnel release        No follow-ups on file.    Reassurance was provided to the patient today.  Her dressing was removed and placed a large Band-Aid over the operative site.  I encouraged her to take a daily dose of a nonsedating antihistamine such as Zyrtec or Allegra for the next week.  Also encouraged her to use 50 mg of Benadryl at bedtime as well for the next week all to help reduce the risk of any type of recurrent allergic mediated reaction.  We will see her back in 1 week for wound check and suture removal.      Garry Deleon, REN, PA-C    This note was created using [employer] voice recognition software that occasionally misinterprets words or phrases.

## 2023-02-01 ENCOUNTER — OFFICE VISIT (OUTPATIENT)
Dept: ORTHOPEDICS | Facility: CLINIC | Age: 43
End: 2023-02-01
Payer: COMMERCIAL

## 2023-02-01 VITALS
WEIGHT: 260 LBS | DIASTOLIC BLOOD PRESSURE: 72 MMHG | BODY MASS INDEX: 47.84 KG/M2 | HEIGHT: 62 IN | SYSTOLIC BLOOD PRESSURE: 123 MMHG

## 2023-02-01 DIAGNOSIS — Z98.890 STATUS POST CARPAL TUNNEL RELEASE: Primary | ICD-10-CM

## 2023-02-01 PROCEDURE — 3078F DIAST BP <80 MM HG: CPT | Mod: CPTII,S$GLB,, | Performed by: PHYSICIAN ASSISTANT

## 2023-02-01 PROCEDURE — 3074F PR MOST RECENT SYSTOLIC BLOOD PRESSURE < 130 MM HG: ICD-10-PCS | Mod: CPTII,S$GLB,, | Performed by: PHYSICIAN ASSISTANT

## 2023-02-01 PROCEDURE — 3074F SYST BP LT 130 MM HG: CPT | Mod: CPTII,S$GLB,, | Performed by: PHYSICIAN ASSISTANT

## 2023-02-01 PROCEDURE — 99024 POSTOP FOLLOW-UP VISIT: CPT | Mod: S$GLB,,, | Performed by: PHYSICIAN ASSISTANT

## 2023-02-01 PROCEDURE — 1159F MED LIST DOCD IN RCRD: CPT | Mod: CPTII,S$GLB,, | Performed by: PHYSICIAN ASSISTANT

## 2023-02-01 PROCEDURE — 3008F BODY MASS INDEX DOCD: CPT | Mod: CPTII,S$GLB,, | Performed by: PHYSICIAN ASSISTANT

## 2023-02-01 PROCEDURE — 3008F PR BODY MASS INDEX (BMI) DOCUMENTED: ICD-10-PCS | Mod: CPTII,S$GLB,, | Performed by: PHYSICIAN ASSISTANT

## 2023-02-01 PROCEDURE — 3078F PR MOST RECENT DIASTOLIC BLOOD PRESSURE < 80 MM HG: ICD-10-PCS | Mod: CPTII,S$GLB,, | Performed by: PHYSICIAN ASSISTANT

## 2023-02-01 PROCEDURE — 1159F PR MEDICATION LIST DOCUMENTED IN MEDICAL RECORD: ICD-10-PCS | Mod: CPTII,S$GLB,, | Performed by: PHYSICIAN ASSISTANT

## 2023-02-01 PROCEDURE — 1160F PR REVIEW ALL MEDS BY PRESCRIBER/CLIN PHARMACIST DOCUMENTED: ICD-10-PCS | Mod: CPTII,S$GLB,, | Performed by: PHYSICIAN ASSISTANT

## 2023-02-01 PROCEDURE — 99024 PR POST-OP FOLLOW-UP VISIT: ICD-10-PCS | Mod: S$GLB,,, | Performed by: PHYSICIAN ASSISTANT

## 2023-02-01 PROCEDURE — 1160F RVW MEDS BY RX/DR IN RCRD: CPT | Mod: CPTII,S$GLB,, | Performed by: PHYSICIAN ASSISTANT

## 2023-02-01 NOTE — PROGRESS NOTES
[facility] ORTHOPEDICS  [address]  [place] [address]  Phone: [phone]   [phone]    Patient's PCP:Pankaj Olivera NP  Referring Provider: No ref. provider found    POST-OP Note:    Subjective:        Chief Complaint:   Chief Complaint   Patient presents with    Left Hand - Post-op Evaluation     PO Left CTR 01/18/23. Doing Very well. Here for suture removal       Past Medical History:   Diagnosis Date    Anxiety     Carpal tunnel syndrome 08/2022    CHF (congestive heart failure) 08/2022    after sepsis from kidney stones  - [facility]    Depression     Diabetes mellitus 2008    Glaucoma     Goiter     Hx of oral aphthous ulcers     Portal hypertension     Sepsis, unspecified organism 08/2022    after kidney stones -       Past Surgical History:   Procedure Laterality Date    APPENDECTOMY      CARPAL TUNNEL RELEASE Left 1/18/2023    Procedure: RELEASE, CARPAL TUNNEL, LEFT;  Surgeon: Alexi Ayoub MD;  Location: [facility];  Service: Orthopedics;  Laterality: Left;    CYSTOSCOPY W/ URETERAL STENT PLACEMENT      FOREIGN BODY REMOVAL      foot    TONSILLECTOMY      uterine ablation         Current Outpatient Medications   Medication Sig    calcium carbonate/vitamin D3 (CALCIUM 500 + D ORAL) Take 1 tablet by mouth Daily.    citalopram (CELEXA) 40 MG tablet Take 40 mg by mouth once daily.     cyclobenzaprine (FLEXERIL) 10 MG tablet Take 10 mg by mouth.    gabapentin (NEURONTIN) 300 MG capsule Take 600 mg by mouth 3 (three) times daily.     glimepiride (AMARYL) 2 MG tablet Take by mouth.    hydrOXYzine HCL (ATARAX) 10 MG Tab Take 10 mg by mouth 2 (two) times daily.    loratadine (CLARITIN) 10 mg tablet Take 10 mg by mouth.    metformin (GLUCOPHAGE-XR) 500 MG 24 hr tablet Take 500 mg by mouth 2 (two) times daily with meals.    metoprolol succinate (TOPROL-XL) 25 MG 24 hr tablet Take 25 mg by mouth.    MULTIVITAMIN ORAL Take 1 tablet by mouth Daily.    naproxen (NAPROSYN) 375 MG  tablet Take 375 mg by mouth.    ondansetron (ZOFRAN-ODT) 4 MG TbDL Take 1 tablet by mouth every 8 (eight) hours as needed.    pantoprazole (PROTONIX) 40 MG tablet Take 40 mg by mouth.    pseudoephedrine (SUDAFED) 30 MG tablet Take 30 mg by mouth every 6 (six) hours as needed.    traMADoL (ULTRAM) 50 mg tablet Take 50 mg by mouth every 8 (eight) hours as needed.     No current facility-administered medications for this visit.       Review of patient's allergies indicates:   Allergen Reactions    Oxycodone Itching       History reviewed. No pertinent family history.    Social History     Socioeconomic History    Marital status:    Tobacco Use    Smoking status: Never   Substance and Sexual Activity    Alcohol use: No    Drug use: No    Sexual activity: Yes     Partners: Male       History of present illness:  42-year-old female returns to clinic today for suture removal and wound check status post left carpal tunnel release done January 18th.      Review of Systems:    Musculoskeletal:  See HPI       Objective:        Physical Examination:    Vital Signs:   Vitals:    02/01/23 0754   BP: 123/72       Body mass index is 47.55 kg/m².    This a well-developed, well nourished patient in no acute distress.  They are alert and oriented and cooperative to examination.        Examination of the left hand and wrist, well-healed volar incision consistent with history of recent carpal tunnel release.  Patient without any significant dermatitis or suture reaction.  Actually very well healed.  Skin is dry and intact, no erythema ecchymosis no signs symptoms of infection.  No wound failure dehiscence.  Sutures removed easily.    Pertinent New Results:     XRAY Report / Interpretation:        Assessment:       1. Status post carpal tunnel release      Plan:     Status post carpal tunnel release  Comments:  Left        Follow up if symptoms worsen or fail to improve.    Stable status post left carpal tunnel release 2 weeks  ago.  Sutures removed today.  Excellent wound healing.  She can return to work, Tuesday February 7th.  No restrictions.  Follow up wilfredo    [unfilled]  REN Banda PA-C    This note was created using [employer] voice recognition software that occasionally misinterprets words or phrases.

## 2023-02-01 NOTE — LETTER
February 1, 2023      [facility] Orthopedics  [address]  [place] [address]  Phone: [phone]  Fax: [phone]       Patient: Jennie Harmon   YOB: 1980  Date of Visit: 02/01/2023    To Whom It May Concern:    Kingsley Harmon was seen in our office 02/01/2023. She may return to work with no restrictions as of 02/07/2023.        Sincerely,    SREEDHAR Owens

## 2023-02-13 ENCOUNTER — PATIENT MESSAGE (OUTPATIENT)
Dept: ORTHOPEDICS | Facility: CLINIC | Age: 43
End: 2023-02-13

## 2023-02-14 ENCOUNTER — OFFICE VISIT (OUTPATIENT)
Dept: ORTHOPEDICS | Facility: CLINIC | Age: 43
End: 2023-02-14
Payer: COMMERCIAL

## 2023-02-14 ENCOUNTER — TELEPHONE (OUTPATIENT)
Dept: ORTHOPEDICS | Facility: CLINIC | Age: 43
End: 2023-02-14

## 2023-02-14 VITALS — WEIGHT: 260 LBS | BODY MASS INDEX: 47.84 KG/M2 | HEIGHT: 62 IN

## 2023-02-14 DIAGNOSIS — Z98.890 STATUS POST CARPAL TUNNEL RELEASE: Primary | ICD-10-CM

## 2023-02-14 PROCEDURE — 1159F MED LIST DOCD IN RCRD: CPT | Mod: CPTII,S$GLB,, | Performed by: ORTHOPAEDIC SURGERY

## 2023-02-14 PROCEDURE — 99024 PR POST-OP FOLLOW-UP VISIT: ICD-10-PCS | Mod: S$GLB,,, | Performed by: ORTHOPAEDIC SURGERY

## 2023-02-14 PROCEDURE — 1160F PR REVIEW ALL MEDS BY PRESCRIBER/CLIN PHARMACIST DOCUMENTED: ICD-10-PCS | Mod: CPTII,S$GLB,, | Performed by: ORTHOPAEDIC SURGERY

## 2023-02-14 PROCEDURE — 3008F BODY MASS INDEX DOCD: CPT | Mod: CPTII,S$GLB,, | Performed by: ORTHOPAEDIC SURGERY

## 2023-02-14 PROCEDURE — 1160F RVW MEDS BY RX/DR IN RCRD: CPT | Mod: CPTII,S$GLB,, | Performed by: ORTHOPAEDIC SURGERY

## 2023-02-14 PROCEDURE — 1159F PR MEDICATION LIST DOCUMENTED IN MEDICAL RECORD: ICD-10-PCS | Mod: CPTII,S$GLB,, | Performed by: ORTHOPAEDIC SURGERY

## 2023-02-14 PROCEDURE — 99024 POSTOP FOLLOW-UP VISIT: CPT | Mod: S$GLB,,, | Performed by: ORTHOPAEDIC SURGERY

## 2023-02-14 PROCEDURE — 3008F PR BODY MASS INDEX (BMI) DOCUMENTED: ICD-10-PCS | Mod: CPTII,S$GLB,, | Performed by: ORTHOPAEDIC SURGERY

## 2023-02-14 NOTE — TELEPHONE ENCOUNTER
----- Message from Sindi Crawford sent at 2/13/2023  4:13 PM CST -----  Patient had surgery 01/17/23 - Her incision looks good but she has blisters all around it.  Please call her to discuss.

## 2023-02-14 NOTE — PROGRESS NOTES
[facility] ORTHOPEDICS POST-OP NOTE    Subjective:           Chief Complaint:   Chief Complaint   Patient presents with    Left Hand - Post-op Evaluation, Numbness     PO left CTR 01/18/23. Patient concerned about incision site. States that she is concerned about the numbness in her hand       Past Medical History:   Diagnosis Date    Anxiety     Carpal tunnel syndrome 08/2022    CHF (congestive heart failure) 08/2022    after sepsis from kidney stones  - [facility]    Depression     Diabetes mellitus 2008    Glaucoma     Goiter     Hx of oral aphthous ulcers     Portal hypertension     Sepsis, unspecified organism 08/2022    after kidney stones -       Past Surgical History:   Procedure Laterality Date    APPENDECTOMY      CARPAL TUNNEL RELEASE Left 1/18/2023    Procedure: RELEASE, CARPAL TUNNEL, LEFT;  Surgeon: Alexi Ayoub MD;  Location: [facility] OR;  Service: Orthopedics;  Laterality: Left;    CYSTOSCOPY W/ URETERAL STENT PLACEMENT      FOREIGN BODY REMOVAL      foot    TONSILLECTOMY      uterine ablation         Current Outpatient Medications   Medication Sig    calcium carbonate/vitamin D3 (CALCIUM 500 + D ORAL) Take 1 tablet by mouth Daily.    citalopram (CELEXA) 40 MG tablet Take 40 mg by mouth once daily.     cyclobenzaprine (FLEXERIL) 10 MG tablet Take 10 mg by mouth.    gabapentin (NEURONTIN) 300 MG capsule Take 600 mg by mouth 3 (three) times daily.     glimepiride (AMARYL) 2 MG tablet Take by mouth.    hydrOXYzine HCL (ATARAX) 10 MG Tab Take 10 mg by mouth 2 (two) times daily.    loratadine (CLARITIN) 10 mg tablet Take 10 mg by mouth.    metformin (GLUCOPHAGE-XR) 500 MG 24 hr tablet Take 500 mg by mouth 2 (two) times daily with meals.    metoprolol succinate (TOPROL-XL) 25 MG 24 hr tablet Take 25 mg by mouth.    MULTIVITAMIN ORAL Take 1 tablet by mouth Daily.    naproxen (NAPROSYN) 375 MG tablet Take 375 mg by mouth.    ondansetron (ZOFRAN-ODT) 4 MG TbDL Take 1 tablet by mouth every 8 (eight)  hours as needed.    pantoprazole (PROTONIX) 40 MG tablet Take 40 mg by mouth.    pseudoephedrine (SUDAFED) 30 MG tablet Take 30 mg by mouth every 6 (six) hours as needed.    traMADoL (ULTRAM) 50 mg tablet Take 50 mg by mouth every 8 (eight) hours as needed.     No current facility-administered medications for this visit.       Review of patient's allergies indicates:   Allergen Reactions    Oxycodone Itching       History reviewed. No pertinent family history.    Social History     Socioeconomic History    Marital status:    Tobacco Use    Smoking status: Never   Substance and Sexual Activity    Alcohol use: No    Drug use: No    Sexual activity: Yes     Partners: Male       History of present illness:  42-year-old female, status post left carpal tunnel release done on January 18th.  She had a postop day 1 visit on January 19th, she followed up in the office about a week later January 25th for evaluation of some wound changes.  She was having possibly a mild allergic reaction with pruritus.  We did a dressing change.  She was seen again on February 1st.  At that time her stitches were removed.  She was noticed to have any dermatitis and then.    Patient returns to clinic today, she is now 4 weeks status post left carpal tunnel release.  She is complaining of a rash at the site of her surgical incision.  Skin is erythematous, it is pruritic, she has developed several little bullous lesions.      Review of Systems:    Musculoskeletal:  See HPI      Objective:        Physical Examination:    Vital Signs:  There were no vitals filed for this visit.    Body mass index is 47.55 kg/m².    This a well-developed, well nourished patient in no acute distress.  They are alert and oriented and cooperative to examination.        Examination of the left hand, volar surgical incision consistent with history of left carpal tunnel release.  This appears to be well healed no evidence of wound failure dehiscence or infection.   "She does have some mildly erythematous skin, it is well demarcated around the surgical incision.  She also has some dry scaling skin.  She also has several bullous lesions.    Pertinent New Results:    XRAY Report / Interpretation:       Assessment/Plan:      Left hand dermatitis status post left carpal tunnel syndrome.  Will try topical over-the-counter hydrocortisone 1% apply to the affected area 3-4 times daily.  Gentle scrubbing mild soap water.  Use a moisturizing soap.  Pat dry.  Follow-up 2 weeks.    Lam Donovan, Physician Assistant, served in the capacity as a "scribe" for this patient encounter.  A "face-to-face" encounter occurred with Dr. Alexi Ayoub on this date.  The treatment plan and medical decision-making is outlined above. Patient was seen and examined with a chaperone.     This note was created using [employer] voice recognition software that occasionally misinterpreted phrases or words.      "

## 2023-02-20 ENCOUNTER — OFFICE VISIT (OUTPATIENT)
Dept: ORTHOPEDICS | Facility: CLINIC | Age: 43
End: 2023-02-20
Payer: COMMERCIAL

## 2023-02-20 VITALS — WEIGHT: 260 LBS | BODY MASS INDEX: 47.84 KG/M2 | HEIGHT: 62 IN

## 2023-02-20 DIAGNOSIS — Z98.890 STATUS POST CARPAL TUNNEL RELEASE: Primary | ICD-10-CM

## 2023-02-20 PROCEDURE — 99024 PR POST-OP FOLLOW-UP VISIT: ICD-10-PCS | Mod: S$GLB,,, | Performed by: PHYSICIAN ASSISTANT

## 2023-02-20 PROCEDURE — 1159F PR MEDICATION LIST DOCUMENTED IN MEDICAL RECORD: ICD-10-PCS | Mod: CPTII,S$GLB,, | Performed by: PHYSICIAN ASSISTANT

## 2023-02-20 PROCEDURE — 1159F MED LIST DOCD IN RCRD: CPT | Mod: CPTII,S$GLB,, | Performed by: PHYSICIAN ASSISTANT

## 2023-02-20 PROCEDURE — 3008F BODY MASS INDEX DOCD: CPT | Mod: CPTII,S$GLB,, | Performed by: PHYSICIAN ASSISTANT

## 2023-02-20 PROCEDURE — 3008F PR BODY MASS INDEX (BMI) DOCUMENTED: ICD-10-PCS | Mod: CPTII,S$GLB,, | Performed by: PHYSICIAN ASSISTANT

## 2023-02-20 PROCEDURE — 99024 POSTOP FOLLOW-UP VISIT: CPT | Mod: S$GLB,,, | Performed by: PHYSICIAN ASSISTANT

## 2023-02-20 PROCEDURE — 1160F PR REVIEW ALL MEDS BY PRESCRIBER/CLIN PHARMACIST DOCUMENTED: ICD-10-PCS | Mod: CPTII,S$GLB,, | Performed by: PHYSICIAN ASSISTANT

## 2023-02-20 PROCEDURE — 1160F RVW MEDS BY RX/DR IN RCRD: CPT | Mod: CPTII,S$GLB,, | Performed by: PHYSICIAN ASSISTANT

## 2023-02-20 RX ORDER — GABAPENTIN 600 MG/1
600 TABLET ORAL 3 TIMES DAILY
COMMUNITY
Start: 2023-01-28

## 2023-02-20 RX ORDER — MUPIROCIN 20 MG/G
OINTMENT TOPICAL
COMMUNITY
Start: 2023-02-16

## 2023-02-20 RX ORDER — DOXYCYCLINE HYCLATE 100 MG
100 TABLET ORAL 2 TIMES DAILY
COMMUNITY
Start: 2023-02-16

## 2023-02-20 NOTE — PROGRESS NOTES
[facility] ORTHOPEDICS  [address]  [place] [address]  Phone: [phone]   [phone]    Patient's PCP:Pankaj Olivera NP  Referring Provider: No ref. provider found    POST-OP Note:    Subjective:        Chief Complaint:   Chief Complaint   Patient presents with    Left Hand - Post-op Evaluation     S/p Left Carpal Tunnel Release on 1/18/23, c/o deep pain, itching , saw PCP lat Thursday, started her on Doxycycline       Past Medical History:   Diagnosis Date    Anxiety     Carpal tunnel syndrome 08/2022    CHF (congestive heart failure) 08/2022    after sepsis from kidney stones  - [facility]    Depression     Diabetes mellitus 2008    Glaucoma     Goiter     Hx of oral aphthous ulcers     Portal hypertension     Sepsis, unspecified organism 08/2022    after kidney stones -       Past Surgical History:   Procedure Laterality Date    APPENDECTOMY      CARPAL TUNNEL RELEASE Left 1/18/2023    Procedure: RELEASE, CARPAL TUNNEL, LEFT;  Surgeon: Alexi Ayoub MD;  Location: [facility];  Service: Orthopedics;  Laterality: Left;    CYSTOSCOPY W/ URETERAL STENT PLACEMENT      FOREIGN BODY REMOVAL      foot    TONSILLECTOMY      uterine ablation         Current Outpatient Medications   Medication Sig    calcium carbonate/vitamin D3 (CALCIUM 500 + D ORAL) Take 1 tablet by mouth Daily.    citalopram (CELEXA) 40 MG tablet Take 40 mg by mouth once daily.     cyclobenzaprine (FLEXERIL) 10 MG tablet Take 10 mg by mouth.    gabapentin (NEURONTIN) 300 MG capsule Take 600 mg by mouth 3 (three) times daily.     glimepiride (AMARYL) 2 MG tablet Take by mouth.    hydrOXYzine HCL (ATARAX) 10 MG Tab Take 10 mg by mouth 2 (two) times daily.    loratadine (CLARITIN) 10 mg tablet Take 10 mg by mouth.    metformin (GLUCOPHAGE-XR) 500 MG 24 hr tablet Take 500 mg by mouth 2 (two) times daily with meals.    metoprolol succinate (TOPROL-XL) 25 MG 24 hr tablet Take 25 mg by mouth.    MULTIVITAMIN ORAL Take 1  tablet by mouth Daily.    mupirocin (BACTROBAN) 2 % ointment Apply small amount to affected area TID x 7 days    naproxen (NAPROSYN) 375 MG tablet Take 375 mg by mouth.    ondansetron (ZOFRAN-ODT) 4 MG TbDL Take 1 tablet by mouth every 8 (eight) hours as needed.    pantoprazole (PROTONIX) 40 MG tablet Take 40 mg by mouth.    pseudoephedrine (SUDAFED) 30 MG tablet Take 30 mg by mouth every 6 (six) hours as needed.    traMADoL (ULTRAM) 50 mg tablet Take 50 mg by mouth every 8 (eight) hours as needed.    doxycycline (VIBRA-TABS) 100 MG tablet Take 100 mg by mouth 2 (two) times daily.    gabapentin (NEURONTIN) 600 MG tablet Take 600 mg by mouth 3 (three) times daily.     No current facility-administered medications for this visit.       Review of patient's allergies indicates:   Allergen Reactions    Oxycodone Itching       History reviewed. No pertinent family history.    Social History     Socioeconomic History    Marital status:    Tobacco Use    Smoking status: Never   Substance and Sexual Activity    Alcohol use: No    Drug use: No    Sexual activity: Yes     Partners: Male       History of present illness:  Patient comes in today for follow-up for her left carpal tunnel release.  She is approximately 1 month postop.  Unfortunately, she had an allergic response to some part of the dressing postoperatively.  She had a good bit of urticaria to the left palm, wrist, and into her forearm.  We initially treated this with a nonsedating antihistamine each morning with 50 mg of Benadryl in the evening.  This started at approximately 1 week postop.  At her 2 week postop visit for suture removal, we added topical hydrocortisone.  She was slowly improving.  She was seen by her primary care provider as well as an ER provider since that last visit.  It appears they were working on treating an infection.  She was on oral doxycycline, topical Bactroban, but also placed on oral Atarax.  She reports the skin lesions are  improving however, it is still pruritic.    Review of Systems:    Musculoskeletal:  See HPI       Objective:        Physical Examination:    Vital Signs: There were no vitals filed for this visit.    Body mass index is 47.55 kg/m².    This a well-developed, well nourished patient in no acute distress.  They are alert and oriented and cooperative to examination.        Left hand exam:  Skin to left hand is clean dry and intact.  There is no erythema or ecchymosis.  Left hand palmar incision is healing well without wound dehiscence or drainage.  She does have 2 small urticarial lesions in the left palm near the incision site.  There is no fluctuance.  There is no warmth.  There are no signs or symptoms of infection.  She is neurovascularly intact throughout the left upper extremity.  She can open and close her left hand into a fist.  She can oppose left thumb to all digits in her left hand.    Pertinent New Results:     XRAY Report / Interpretation:  No new radiographs were taken on today's clinic visit.     Assessment:       1. Status post carpal tunnel release      Plan:     Status post carpal tunnel release        Follow up if symptoms worsen or fail to improve.    Reassurance was provided to the patient today.  I encouraged her to discontinue her topical antibiotics and complete her current course of oral antibiotics.  I do not believe this is an infectious process however.  Encouraged her to begin using the hydrocortisone topically 3 times a day.  Continue using the Atarax every 8 hours as prescribed.  She reports she has an adequate supply.  This should continue to progressively improve.  For when I last saw her, approximally 3 weeks ago, she is much improved with her contact dermatitis/urticarial lesions.  We are simply now trying to control the pruritus.  She will follow up with us on an as-needed basis if any issues or concerns arise.      Garry Deleon, REN, PA-C    This note was created using [employer]  voice recognition software that occasionally misinterprets words or phrases.

## 2023-04-12 ENCOUNTER — OFFICE VISIT (OUTPATIENT)
Dept: URGENT CARE | Facility: CLINIC | Age: 43
End: 2023-04-12
Payer: COMMERCIAL

## 2023-04-12 VITALS
DIASTOLIC BLOOD PRESSURE: 80 MMHG | TEMPERATURE: 98 F | SYSTOLIC BLOOD PRESSURE: 130 MMHG | BODY MASS INDEX: 48.09 KG/M2 | WEIGHT: 271.38 LBS | OXYGEN SATURATION: 94 % | RESPIRATION RATE: 16 BRPM | HEART RATE: 74 BPM | HEIGHT: 63 IN

## 2023-04-12 DIAGNOSIS — N30.01 ACUTE CYSTITIS WITH HEMATURIA: Primary | ICD-10-CM

## 2023-04-12 DIAGNOSIS — R10.2 PELVIC PAIN: ICD-10-CM

## 2023-04-12 DIAGNOSIS — R35.0 URINE FREQUENCY: ICD-10-CM

## 2023-04-12 DIAGNOSIS — L73.9 FOLLICULITIS OF RIGHT AXILLA: ICD-10-CM

## 2023-04-12 DIAGNOSIS — R39.15 URGENCY OF MICTURITION: ICD-10-CM

## 2023-04-12 LAB
BILIRUB UR QL STRIP: NEGATIVE
GLUCOSE UR QL STRIP: NEGATIVE
KETONES UR QL STRIP: NEGATIVE
LEUKOCYTE ESTERASE UR QL STRIP: POSITIVE
PH, POC UA: 5.5
POC BLOOD, URINE: NEGATIVE
POC NITRATES, URINE: NEGATIVE
PROT UR QL STRIP: POSITIVE
SP GR UR STRIP: 1.02 (ref 1–1.03)
UROBILINOGEN UR STRIP-ACNC: ABNORMAL (ref 0.1–1.1)

## 2023-04-12 PROCEDURE — 99204 OFFICE O/P NEW MOD 45 MIN: CPT | Mod: S$GLB,,, | Performed by: NURSE PRACTITIONER

## 2023-04-12 PROCEDURE — 81003 URINALYSIS AUTO W/O SCOPE: CPT | Mod: QW,S$GLB,, | Performed by: NURSE PRACTITIONER

## 2023-04-12 PROCEDURE — 99204 PR OFFICE/OUTPT VISIT, NEW, LEVL IV, 45-59 MIN: ICD-10-PCS | Mod: S$GLB,,, | Performed by: NURSE PRACTITIONER

## 2023-04-12 PROCEDURE — 81003 POCT URINALYSIS, DIPSTICK, AUTOMATED, W/O SCOPE: ICD-10-PCS | Mod: QW,S$GLB,, | Performed by: NURSE PRACTITIONER

## 2023-04-12 RX ORDER — CEPHALEXIN 500 MG/1
500 TABLET ORAL 4 TIMES DAILY
Qty: 40 TABLET | Refills: 0 | Status: SHIPPED | OUTPATIENT
Start: 2023-04-12 | End: 2023-04-17

## 2023-04-12 RX ORDER — CLOTRIMAZOLE AND BETAMETHASONE DIPROPIONATE 10; .64 MG/G; MG/G
CREAM TOPICAL
COMMUNITY
Start: 2023-04-06

## 2023-04-12 NOTE — PROGRESS NOTES
"Subjective:      Patient ID: Jennie Harmon is a 42 y.o. female.    Vitals:  height is 5' 3" (1.6 m) and weight is 123.1 kg (271 lb 6.4 oz). Her oral temperature is 97.6 °F (36.4 °C). Her blood pressure is 130/80 and her pulse is 74. Her respiration is 16 and oxygen saturation is 94% (abnormal).     Chief Complaint: Urinary Tract Infection and Adenopathy (x1 week, swollen lymph nodes )    Jennie Harmon presents to clinic for UTI s/s and bumps in the right axilla that popped up a few days after shaving.     Urinary Tract Infection   This is a new problem. The current episode started in the past 7 days (3 days). The problem occurs every urination. The problem has been gradually worsening. The quality of the pain is described as burning. The pain is at a severity of 6/10. The pain is moderate. There has been no fever. She is Not sexually active. Associated symptoms include frequency, hesitancy, nausea, urgency and rash. She has tried nothing for the symptoms. Her past medical history is significant for diabetes mellitus.     Constitution: Negative.   HENT: Negative.     Neck: neck negative.   Cardiovascular: Negative.    Eyes: Negative.    Respiratory: Negative.     Gastrointestinal:  Positive for nausea.   Endocrine: negative.   Genitourinary:  Positive for dysuria, frequency and urgency.   Musculoskeletal: Negative.    Skin:  Positive for rash.    Objective:     Physical Exam   Constitutional: She is oriented to person, place, and time. She appears well-developed. She is cooperative.  Non-toxic appearance. She does not appear ill. No distress.   HENT:   Head: Normocephalic and atraumatic.   Ears:   Right Ear: Hearing, tympanic membrane, external ear and ear canal normal.   Left Ear: Hearing, tympanic membrane, external ear and ear canal normal.   Nose: Nose normal. No mucosal edema, rhinorrhea or nasal deformity. No epistaxis. Right sinus exhibits no maxillary sinus tenderness and no frontal sinus tenderness. Left " sinus exhibits no maxillary sinus tenderness and no frontal sinus tenderness.   Mouth/Throat: Uvula is midline, oropharynx is clear and moist and mucous membranes are normal. No trismus in the jaw. Normal dentition. No uvula swelling. No oropharyngeal exudate, posterior oropharyngeal edema or posterior oropharyngeal erythema.   Eyes: Conjunctivae and lids are normal. No scleral icterus.   Neck: Trachea normal and phonation normal. Neck supple. No edema present. No erythema present. No neck rigidity present.   Cardiovascular: Normal rate, regular rhythm, normal heart sounds and normal pulses.   Pulmonary/Chest: Effort normal and breath sounds normal. No respiratory distress. She has no decreased breath sounds. She has no rhonchi.   Abdominal: Normal appearance. There is abdominal tenderness in the suprapubic area.   Musculoskeletal: Normal range of motion.         General: No deformity. Normal range of motion.   Neurological: She is alert and oriented to person, place, and time. She exhibits normal muscle tone. Coordination normal.   Skin: Skin is warm, dry, intact, not diaphoretic, not pale, rash, nodular, macular and papular.        Psychiatric: Her speech is normal and behavior is normal. Judgment and thought content normal.   Nursing note and vitals reviewed.    Assessment:     1. Acute cystitis with hematuria    2. Pelvic pain    3. Urine frequency    4. Urgency of micturition    5. Folliculitis of right axilla        Plan:       Acute cystitis with hematuria  -     Urine culture  -     cephalexin (KEFTAB) 500 mg tablet; Take 1 tablet (500 mg total) by mouth 4 (four) times daily. for 10 days  Dispense: 40 tablet; Refill: 0    Pelvic pain  -     POCT Urinalysis, Dipstick, Automated, W/O Scope  -     Urine culture  -     cephalexin (KEFTAB) 500 mg tablet; Take 1 tablet (500 mg total) by mouth 4 (four) times daily. for 10 days  Dispense: 40 tablet; Refill: 0    Urine frequency  -     Urine culture  -     cephalexin  (KEFTAB) 500 mg tablet; Take 1 tablet (500 mg total) by mouth 4 (four) times daily. for 10 days  Dispense: 40 tablet; Refill: 0    Urgency of micturition  -     Urine culture  -     cephalexin (KEFTAB) 500 mg tablet; Take 1 tablet (500 mg total) by mouth 4 (four) times daily. for 10 days  Dispense: 40 tablet; Refill: 0    Folliculitis of right axilla  -     cephalexin (KEFTAB) 500 mg tablet; Take 1 tablet (500 mg total) by mouth 4 (four) times daily. for 10 days  Dispense: 40 tablet; Refill: 0

## 2023-04-17 ENCOUNTER — TELEPHONE (OUTPATIENT)
Dept: URGENT CARE | Facility: CLINIC | Age: 43
End: 2023-04-17
Payer: COMMERCIAL

## 2023-04-17 LAB
BACTERIA UR CULT: ABNORMAL
BACTERIA UR CULT: ABNORMAL
OTHER ANTIBIOTIC SUSC ISLT: ABNORMAL

## 2023-04-17 RX ORDER — NITROFURANTOIN 25; 75 MG/1; MG/1
100 CAPSULE ORAL 2 TIMES DAILY
Qty: 14 CAPSULE | Refills: 0 | Status: SHIPPED | OUTPATIENT
Start: 2023-04-17 | End: 2023-04-24

## 2023-04-17 NOTE — TELEPHONE ENCOUNTER
Jennie Harmon has been contacted and notified that her urine culture shows E coli which is resistant to the cephalexin she was prescribed in clinic.  New prescription for Macrobid 2 times a day for 7 days sent to patient's pharmacy of choice which is [facility] [place].  Patient understands to discontinue cephalexin at this time.

## 2023-06-09 ENCOUNTER — OFFICE VISIT (OUTPATIENT)
Dept: URGENT CARE | Facility: CLINIC | Age: 43
End: 2023-06-09
Payer: COMMERCIAL

## 2023-06-09 VITALS
DIASTOLIC BLOOD PRESSURE: 83 MMHG | BODY MASS INDEX: 48.02 KG/M2 | RESPIRATION RATE: 18 BRPM | HEIGHT: 63 IN | TEMPERATURE: 98 F | SYSTOLIC BLOOD PRESSURE: 132 MMHG | OXYGEN SATURATION: 97 % | WEIGHT: 271 LBS | HEART RATE: 82 BPM

## 2023-06-09 DIAGNOSIS — R10.9 ABDOMINAL PAIN, UNSPECIFIED ABDOMINAL LOCATION: Primary | ICD-10-CM

## 2023-06-09 DIAGNOSIS — S70.01XA CONTUSION OF RIGHT HIP, INITIAL ENCOUNTER: ICD-10-CM
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
## 2023-06-09 DIAGNOSIS — M25.551 PAIN OF RIGHT HIP: ICD-10-CM

## 2023-06-09 PROCEDURE — 81003 URINALYSIS AUTO W/O SCOPE: CPT | Mod: QW,S$GLB,, | Performed by: STUDENT IN AN ORGANIZED HEALTH CARE EDUCATION/TRAINING PROGRAM

## 2023-06-09 PROCEDURE — 81003 POCT URINALYSIS, DIPSTICK, AUTOMATED, W/O SCOPE: ICD-10-PCS | Mod: QW,S$GLB,, | Performed by: STUDENT IN AN ORGANIZED HEALTH CARE EDUCATION/TRAINING PROGRAM

## 2023-06-09 PROCEDURE — 99214 OFFICE O/P EST MOD 30 MIN: CPT | Mod: S$GLB,,, | Performed by: STUDENT IN AN ORGANIZED HEALTH CARE EDUCATION/TRAINING PROGRAM

## 2023-06-09 PROCEDURE — 99214 PR OFFICE/OUTPT VISIT, EST, LEVL IV, 30-39 MIN: ICD-10-PCS | Mod: S$GLB,,, | Performed by: STUDENT IN AN ORGANIZED HEALTH CARE EDUCATION/TRAINING PROGRAM

## 2023-06-09 RX ORDER — NAPROXEN 500 MG/1
500 TABLET ORAL 2 TIMES DAILY
Qty: 20 TABLET | Refills: 0 | Status: SHIPPED | OUTPATIENT
Start: 2023-06-09 | End: 2023-06-19

## 2023-06-09 NOTE — PROGRESS NOTES
"Subjective:      Patient ID: Jennie Harmon is a 42 y.o. female.    Vitals:  height is 5' 3" (1.6 m) and weight is 122.9 kg (271 lb). Her oral temperature is 98.2 °F (36.8 °C). Her blood pressure is 132/83 and her pulse is 82. Her respiration is 18 and oxygen saturation is 97%.     Chief Complaint: Abdominal Pain (LRQ pain )    Patient is a 42-year-old female with a past medical history of type 2 diabetes, anxiety, depression, glaucoma, and congestive heart failure after sepsis diagnosis who presents to clinic for evaluation of abdominal pain.  Patient reports symptoms times 2-3 weeks now.  Patient reports she was previously evaluated in the emergency department for the same complaint on May 28, 2023.  Patient states that she was diagnosed with a UTI and placed on Omnicef.  Patient reports was also discharged with Phenergan.  Patient reports that symptoms have improved some after being treated in the emergency department with Rocephin, Toradol, and Phenergan.  Patient states that she had a CT because she initially thought she may have had a kidney stone which was negative.  Patient states that she has had a history of recurrent urinary tract infections.  Patient states it seems like she has had a current urinary tract infection since April of 2023.  Patient states has been on now 3 different antibiotics.  Patient states symptoms do not necessarily feel like a UTI however feels more like a muscle pain.  Patient states that she fell to mentioned to the emergency department that she fell on May 24, 2023.  Patient states she had a trip and fall and landed on her right side on a gravel ground.  Patient states she did not have any loss of consciousness with fall nor did she strike her head or neck.  Patient states that her family thinks that the pain may be coming from her hip versus her stomach.  Patient states aggravating factors are position change and movement along with some palpation.  Patient states there are no " real alleviating factors to pain.  Patient states that pain does not radiate to any other location but stays in the right lower side.  Patient states that she has not had any fever or chills, chest pain or palpitations, shortness of breath or cough, nausea or vomiting, diarrhea or constipation, rectal bleeding, urinary symptoms, back pain, neck pain, headaches or dizziness, or any change in mentation.    Abdominal Pain  This is a new problem. The current episode started 1 to 4 weeks ago (2 weeks). The onset quality is sudden. The problem occurs constantly. The problem has been gradually worsening. The pain is located in the RLQ. The pain is at a severity of 8/10. The pain is moderate. The quality of the pain is aching and sharp. The abdominal pain radiates to the back. Associated symptoms include arthralgias (Right hip). Pertinent negatives include no constipation, diarrhea, dysuria, fever, frequency, headaches, nausea or vomiting.     Constitution: Negative. Negative for chills, sweating, fatigue and fever.   HENT: Negative.     Neck: neck negative. Negative for neck pain and neck swelling.   Cardiovascular: Negative.  Negative for chest pain and palpitations.   Eyes: Negative.    Respiratory: Negative.  Negative for chest tightness, cough and shortness of breath.    Gastrointestinal:  Positive for abdominal pain (Right lower quadrant above the hip). Negative for nausea, vomiting, constipation, diarrhea and rectal bleeding.   Endocrine: negative.   Genitourinary: Negative.  Negative for dysuria, frequency, urgency, flank pain, vaginal discharge, vaginal bleeding, vaginal odor and pelvic pain.   Musculoskeletal:  Positive for trauma (Fall on May 24, 2023.) and joint pain (Right hip). Negative for joint swelling, abnormal ROM of joint and back pain.   Skin: Negative.  Negative for color change, pale, rash, wound and erythema.   Allergic/Immunologic: Negative.    Neurological: Negative.  Negative for dizziness,  light-headedness, passing out, headaches, disorientation and altered mental status.   Hematologic/Lymphatic: Negative.    Psychiatric/Behavioral: Negative.  Negative for altered mental status, disorientation and confusion.     Objective:     Physical Exam   Constitutional: She is oriented to person, place, and time. She appears well-developed. She is cooperative.  Non-toxic appearance. She does not appear ill. No distress. obesity  HENT:   Head: Normocephalic and atraumatic.   Ears:   Right Ear: Hearing, tympanic membrane, external ear and ear canal normal.   Left Ear: Hearing, tympanic membrane, external ear and ear canal normal.   Nose: Nose normal. No mucosal edema or nasal deformity. No epistaxis. Right sinus exhibits no maxillary sinus tenderness and no frontal sinus tenderness. Left sinus exhibits no maxillary sinus tenderness and no frontal sinus tenderness.   Mouth/Throat: Uvula is midline, oropharynx is clear and moist and mucous membranes are normal. No trismus in the jaw. Normal dentition. No uvula swelling.   Eyes: Conjunctivae and lids are normal. Pupils are equal, round, and reactive to light.   Neck: Trachea normal and phonation normal. Neck supple. No neck rigidity present.   Cardiovascular: Normal rate, regular rhythm, normal heart sounds and normal pulses.   Pulmonary/Chest: Effort normal and breath sounds normal. No respiratory distress. She has no wheezes. She has no rhonchi. She has no rales.   Abdominal: Normal appearance and bowel sounds are normal. She exhibits no distension. Soft. There is abdominal tenderness in the right lower quadrant. There is no rebound, no guarding, no tenderness at McBurney's point, no left CVA tenderness, negative Rovsing's sign, negative psoas sign, no right CVA tenderness and negative obturator sign.      Comments: Minimal tenderness to palpation of the right lower quadrant without presence of McBurney sign, Rovsing sign, psoas sign, or obturator sign.    Musculoskeletal: Normal range of motion.         General: Normal range of motion.      Right hip: She exhibits tenderness and bony tenderness. She exhibits normal range of motion, normal strength, no crepitus and no deformity.      Cervical back: She exhibits no tenderness.   Neurological: She is alert and oriented to person, place, and time. She exhibits normal muscle tone.   Skin: Skin is warm, dry, intact, not diaphoretic, not pale and no rash. Capillary refill takes less than 2 seconds. No erythema   Psychiatric: Her speech is normal and behavior is normal. Judgment and thought content normal.   Nursing note and vitals reviewed.    Assessment:     1. Abdominal pain, unspecified abdominal location    2. Fall, initial encounter    3. Pain of right hip    4. Contusion of right hip, initial encounter        Plan:       Abdominal pain, unspecified abdominal location  -     POCT Urinalysis, Dipstick, Automated, W/O Scope  -     Urine culture    Fall, initial encounter  -     X-Ray Hip 2 or 3 views Right (with Pelvis when performed); Future; Expected date: 06/09/2023    Pain of right hip  -     X-Ray Hip 2 or 3 views Right (with Pelvis when performed); Future; Expected date: 06/09/2023    Contusion of right hip, initial encounter    Other orders  -     naproxen (NAPROSYN) 500 MG tablet; Take 1 tablet (500 mg total) by mouth 2 (two) times daily. for 10 days  Dispense: 20 tablet; Refill: 0                Melo Montez MD - 05/28/2023    Formatting of this note might be different from the original.  Exam description: CT STONE PROTOCOL ABD AND PELV WO CONTRAST    Clinical history: 42 years-old Female with RLQ abdominal pain/R flank pain, UTI sxs, hx of kidney stones.    COMPARISON: December 2022.     TECHNIQUE:   Imaging of the abdomen and pelvis was performed from lung bases through pubic symphysis without administration of IV contrast.     This CT examination was performed using one or more of the following dose  reduction techniques: Automated exposure control, adjustment of the mA and or kv according to patient size, use of iterative reconstruction techniques.     Effective Dose: 24.2 mSv      FINDINGS:    Lack of IV contrast limits evaluation of abdominal and pelvic organs.    Visualized lower chest unremarkable. Unenhanced liver is enlarged and fatty. There are multiple small hyperattenuating foci scattered throughout the liver which are similar to prior exam. Unenhanced spleen and pancreas unremarkable. Gallbladder unremarkable. Adrenal glands unremarkable. Unenhanced kidneys unremarkable. Urinary bladder unremarkable. No dilated or thick-walled bowel. Prior appendectomy. No mesenteric inflammatory changes. Unenhanced aorta unremarkable. No abdominal or pelvic lymphadenopathy. Pelvic viscera are unremarkable for age and technique. No acute bony abnormality.    IMPRESSION:   No impression:    1. No acute abnormality.  2. Enlarged, fatty liver, unchanged.  3. Multiple small hyperattenuating foci scattered throughout the liver which are similar to prior exam but are indeterminate. Recommend nonemergent outpatient pre and postcontrast MRI to further evaluate.    This report was signed by Melo Montez MD on 5/28/2023 10:48 PM on the following workstation: [id number].     Emergency department visit from May 28, 2023 reviewed.  Labs:  UA:  Negative blood, negative nitrate, 25+ leukocyte  Urine culture ordered, will call results.    X-ray right hip with pelvis: A fracture of the right hip is not seen.  The femur and acetabulum are well maintained.  The bones of the pelvis are intact as is the left hip.  The sacroiliac joints are sharp.  Take medications as prescribed.  Use of no other NSAIDs while on naproxen; may rotate with Tylenol.    Continue previously prescribed antibiotics for UTI as prescribed via ED.    Assure adequate hydration.    Recommend rotating warm moist heat and ice to the right hip as directed.     Follow-up with PCP in 1-2 days.    Return to clinic as needed.    To ED for any new or acutely worsening symptoms.    Patient in agreement with plan of care.    DISCLAIMER: Please note that my documentation in this Electronic Healthcare Record was produced using speech recognition software and therefore may contain errors related to that software system.These could include grammar, punctuation and spelling errors or the inclusion/exclusion of phrases that were not intended. Garbled syntax, mangled pronouns, and other bizarre constructions may be attributed to that software system.

## 2023-06-14 DIAGNOSIS — N30.00 ACUTE CYSTITIS WITHOUT HEMATURIA: Primary | ICD-10-CM

## 2023-06-14 LAB
BACTERIA UR CULT: ABNORMAL
BACTERIA UR CULT: ABNORMAL
OTHER ANTIBIOTIC SUSC ISLT: ABNORMAL

## 2023-06-14 RX ORDER — NITROFURANTOIN 25; 75 MG/1; MG/1
100 CAPSULE ORAL 2 TIMES DAILY
Qty: 10 CAPSULE | Refills: 0 | Status: SHIPPED | OUTPATIENT
Start: 2023-06-14 | End: 2023-06-19

## 2023-11-19 ENCOUNTER — HOSPITAL ENCOUNTER (EMERGENCY)
Facility: HOSPITAL | Age: 43
Discharge: HOME | End: 2023-11-19
Attending: EMERGENCY MEDICINE

## 2023-11-19 VITALS
DIASTOLIC BLOOD PRESSURE: 65 MMHG | OXYGEN SATURATION: 95 % | TEMPERATURE: 98 F | HEIGHT: 63 IN | HEART RATE: 71 BPM | SYSTOLIC BLOOD PRESSURE: 138 MMHG | WEIGHT: 271 LBS | RESPIRATION RATE: 18 BRPM | BODY MASS INDEX: 48.02 KG/M2

## 2023-11-19 DIAGNOSIS — R10.9 ABDOMINAL PAIN, UNSPECIFIED ABDOMINAL LOCATION: Primary | ICD-10-CM

## 2023-11-19 LAB
ALBUMIN SERPL BCP-MCNC: 3.9 G/DL (ref 3.5–5.2)
ALP SERPL-CCNC: 54 U/L (ref 55–135)
ALT SERPL W/O P-5'-P-CCNC: 31 U/L (ref 10–44)
ANION GAP SERPL CALC-SCNC: 11 MMOL/L (ref 8–16)
AST SERPL-CCNC: 23 U/L (ref 10–40)
B-HCG UR QL: NEGATIVE
BASOPHILS # BLD AUTO: 0.07 K/UL (ref 0–0.2)
BASOPHILS NFR BLD: 0.5 % (ref 0–1.9)
BILIRUB SERPL-MCNC: 0.4 MG/DL (ref 0.1–1)
BILIRUB UR QL STRIP: NEGATIVE
BUN SERPL-MCNC: 12 MG/DL (ref 6–20)
CALCIUM SERPL-MCNC: 9 MG/DL (ref 8.7–10.5)
CHLORIDE SERPL-SCNC: 102 MMOL/L (ref 95–110)
CLARITY UR: CLEAR
CO2 SERPL-SCNC: 25 MMOL/L (ref 23–29)
COLOR UR: YELLOW
CREAT SERPL-MCNC: 0.8 MG/DL (ref 0.5–1.4)
CTP QC/QA: YES
DIFFERENTIAL METHOD: ABNORMAL
EOSINOPHIL # BLD AUTO: 0.2 K/UL (ref 0–0.5)
EOSINOPHIL NFR BLD: 1.7 % (ref 0–8)
ERYTHROCYTE [DISTWIDTH] IN BLOOD BY AUTOMATED COUNT: 13.5 % (ref 11.5–14.5)
EST. GFR  (NO RACE VARIABLE): >60 ML/MIN/1.73 M^2
GLUCOSE SERPL-MCNC: 140 MG/DL (ref 70–110)
GLUCOSE UR QL STRIP: NEGATIVE
HCT VFR BLD AUTO: 38.1 % (ref 37–48.5)
HGB BLD-MCNC: 12.6 G/DL (ref 12–16)
HGB UR QL STRIP: NEGATIVE
IMM GRANULOCYTES # BLD AUTO: 0.06 K/UL (ref 0–0.04)
IMM GRANULOCYTES NFR BLD AUTO: 0.4 % (ref 0–0.5)
KETONES UR QL STRIP: NEGATIVE
LEUKOCYTE ESTERASE UR QL STRIP: NEGATIVE
LIPASE SERPL-CCNC: 23 U/L (ref 4–60)
LYMPHOCYTES # BLD AUTO: 5 K/UL (ref 1–4.8)
LYMPHOCYTES NFR BLD: 36.2 % (ref 18–48)
MCH RBC QN AUTO: 28.6 PG (ref 27–31)
MCHC RBC AUTO-ENTMCNC: 33.1 G/DL (ref 32–36)
MCV RBC AUTO: 86 FL (ref 82–98)
MONOCYTES # BLD AUTO: 0.7 K/UL (ref 0.3–1)
MONOCYTES NFR BLD: 5.1 % (ref 4–15)
NEUTROPHILS # BLD AUTO: 7.7 K/UL (ref 1.8–7.7)
NEUTROPHILS NFR BLD: 56.1 % (ref 38–73)
NITRITE UR QL STRIP: NEGATIVE
NRBC BLD-RTO: 0 /100 WBC
PH UR STRIP: 7 [PH] (ref 5–8)
PLATELET # BLD AUTO: 380 K/UL (ref 150–450)
PMV BLD AUTO: 9 FL (ref 9.2–12.9)
POTASSIUM SERPL-SCNC: 4 MMOL/L (ref 3.5–5.1)
PROT SERPL-MCNC: 7.1 G/DL (ref 6–8.4)
PROT UR QL STRIP: NEGATIVE
RBC # BLD AUTO: 4.41 M/UL (ref 4–5.4)
SODIUM SERPL-SCNC: 138 MMOL/L (ref 136–145)
SP GR UR STRIP: 1.01 (ref 1–1.03)
URN SPEC COLLECT METH UR: NORMAL
UROBILINOGEN UR STRIP-ACNC: NEGATIVE EU/DL
WBC # BLD AUTO: 13.8 K/UL (ref 3.9–12.7)

## 2023-11-19 PROCEDURE — 99284 EMERGENCY DEPT VISIT MOD MDM: CPT | Mod: 25

## 2023-11-19 PROCEDURE — 81025 URINE PREGNANCY TEST: CPT | Performed by: PHYSICIAN ASSISTANT

## 2023-11-19 PROCEDURE — 85025 COMPLETE CBC W/AUTO DIFF WBC: CPT | Performed by: PHYSICIAN ASSISTANT

## 2023-11-19 PROCEDURE — 63600175 PHARM REV CODE 636 W HCPCS: Performed by: PHYSICIAN ASSISTANT

## 2023-11-19 PROCEDURE — 83690 ASSAY OF LIPASE: CPT | Performed by: PHYSICIAN ASSISTANT

## 2023-11-19 PROCEDURE — 96374 THER/PROPH/DIAG INJ IV PUSH: CPT

## 2023-11-19 PROCEDURE — 81003 URINALYSIS AUTO W/O SCOPE: CPT | Performed by: EMERGENCY MEDICINE

## 2023-11-19 PROCEDURE — 36415 COLL VENOUS BLD VENIPUNCTURE: CPT | Performed by: PHYSICIAN ASSISTANT

## 2023-11-19 PROCEDURE — 80053 COMPREHEN METABOLIC PANEL: CPT | Performed by: PHYSICIAN ASSISTANT

## 2023-11-19 RX ORDER — HYDROCODONE BITARTRATE AND ACETAMINOPHEN 5; 325 MG/1; MG/1
1 TABLET ORAL EVERY 6 HOURS PRN
Qty: 12 TABLET | Refills: 0 | Status: SHIPPED | OUTPATIENT
Start: 2023-11-19 | End: 2023-11-22

## 2023-11-19 RX ORDER — KETOROLAC TROMETHAMINE 30 MG/ML
15 INJECTION, SOLUTION INTRAMUSCULAR; INTRAVENOUS
Status: COMPLETED | OUTPATIENT
Start: 2023-11-19 | End: 2023-11-19

## 2023-11-19 RX ORDER — ONDANSETRON 4 MG/1
4 TABLET, ORALLY DISINTEGRATING ORAL EVERY 8 HOURS PRN
Qty: 20 TABLET | Refills: 0 | Status: SHIPPED | OUTPATIENT
Start: 2023-11-19

## 2023-11-19 RX ADMIN — KETOROLAC TROMETHAMINE 15 MG: 30 INJECTION, SOLUTION INTRAMUSCULAR at 07:11

## 2023-11-19 NOTE — ED PROVIDER NOTES
Encounter Date: 11/19/2023       History     Chief Complaint   Patient presents with    Abdominal Pain     R lower quadrant pain x3 days      Jennie Harmon is a 44 y/o female with a PMH of anxiety, depression, CHF, Goiter, DM, nephrolithiasis with 2 stent placements presents to ED with 3 days history of constant 6/10 Right lower and suprapubic abdominal pain. Denies triggering event. Pt reports that pain began while walking outside. Endorses nausea, frequency, and urgency. She describes an intense pressure when she urinates. She denies fever, chills, sweating, vomiting, diarrhea, headache, and pain with urination. Denies vaginal itching, vaginal discharge, blood in urine or blood in stool. She has tried AZO OTC without relief of sxs. She has a Hx of recurrent urinary tract infection in which she is usually treated with Macrobid. She has had an appendectomy over 20 years ago. Denies any recent NSAID or Antibiotic use.    The history is provided by the patient and the spouse.     Review of patient's allergies indicates:   Allergen Reactions    Oxycodone Itching    Iodine Rash     Past Medical History:   Diagnosis Date    Anxiety     Carpal tunnel syndrome 08/2022    CHF (congestive heart failure) 08/2022    after sepsis from kidney stones  - [facility]    Depression     Diabetes mellitus 2008    Glaucoma     Goiter     Hx of oral aphthous ulcers     Portal hypertension     Sepsis, unspecified organism 08/2022    after kidney stones -     Past Surgical History:   Procedure Laterality Date    APPENDECTOMY      CARPAL TUNNEL RELEASE Left 1/18/2023    Procedure: RELEASE, CARPAL TUNNEL, LEFT;  Surgeon: Alexi Ayoub MD;  Location: [facility];  Service: Orthopedics;  Laterality: Left;    CYSTOSCOPY W/ URETERAL STENT PLACEMENT      FOREIGN BODY REMOVAL      foot    TONSILLECTOMY      uterine ablation       No family history on file.  Social History     Tobacco Use    Smoking status: Never   Substance Use Topics     Alcohol use: No    Drug use: No     Review of Systems   Constitutional:  Negative for activity change, appetite change, chills, diaphoresis, fatigue and fever.   HENT:  Negative for congestion, rhinorrhea and sore throat.    Eyes:  Negative for redness and visual disturbance.   Respiratory:  Negative for cough, chest tightness and shortness of breath.    Cardiovascular:  Negative for chest pain, palpitations and leg swelling.   Gastrointestinal:  Positive for abdominal pain and nausea. Negative for blood in stool, constipation, diarrhea and vomiting.   Genitourinary:  Positive for frequency and urgency. Negative for difficulty urinating, dysuria, hematuria and pelvic pain.   Musculoskeletal:  Negative for back pain, myalgias, neck pain and neck stiffness.   Skin:  Negative for pallor and rash.   Neurological:  Negative for dizziness, syncope, light-headedness, numbness and headaches.   Psychiatric/Behavioral:  The patient is not nervous/anxious.        Physical Exam     Initial Vitals [11/19/23 1641]   BP Pulse Resp Temp SpO2   (!) 177/84 77 18 98.1 °F (36.7 °C) 98 %      MAP       --         Physical Exam    Nursing note and vitals reviewed.  Constitutional: Vital signs are normal. She appears well-developed and well-nourished. She is cooperative.  Non-toxic appearance. She does not have a sickly appearance. She does not appear ill.   HENT:   Head: Normocephalic and atraumatic.   Right Ear: External ear normal.   Left Ear: External ear normal.   Nose: Nose normal.   Eyes: Conjunctivae and lids are normal.   Neck: Neck supple.   Cardiovascular:  Normal rate, regular rhythm, normal heart sounds and intact distal pulses.     Exam reveals no gallop and no friction rub.       No murmur heard.  Pulmonary/Chest: Breath sounds normal. No respiratory distress. She has no decreased breath sounds. She has no wheezes. She has no rhonchi. She has no rales.   Abdominal: Abdomen is soft. Bowel sounds are normal. She exhibits no  mass. There is abdominal tenderness in the right lower quadrant and suprapubic area.   No rebound or guarding.   No right CVA tenderness.  No left CVA tenderness. There is no rebound, no guarding and negative Brasher's sign.   Musculoskeletal:         General: No edema.      Cervical back: Neck supple.     Neurological: She is alert and oriented to person, place, and time.   Skin: Skin is warm, dry and intact. Capillary refill takes less than 2 seconds. No rash noted. No erythema.   Psychiatric: She has a normal mood and affect. Her behavior is normal.         ED Course   Procedures  Labs Reviewed   CBC W/ AUTO DIFFERENTIAL - Abnormal; Notable for the following components:       Result Value    WBC 13.80 (*)     MPV 9.0 (*)     Immature Grans (Abs) 0.06 (*)     Lymph # 5.0 (*)     All other components within normal limits    Narrative:     Collection has been rescheduled by [facility] at 11/19/2023 17:36 Reason:   Unable to collect   COMPREHENSIVE METABOLIC PANEL - Abnormal; Notable for the following components:    Glucose 140 (*)     Alkaline Phosphatase 54 (*)     All other components within normal limits    Narrative:     Collection has been rescheduled by [facility] at 11/19/2023 17:36 Reason:   Unable to collect   LIPASE    Narrative:     Collection has been rescheduled by [facility] at 11/19/2023 17:36 Reason:   Unable to collect   URINALYSIS, REFLEX TO URINE CULTURE    Narrative:     Specimen Source->Urine   POCT URINE PREGNANCY          Imaging Results              CT Abdomen Pelvis  Without Contrast (Final result)  Result time 11/20/23 09:15:17      Final result by Fabby Hester MD (11/20/23 09:15:17)                   Impression:      Hepatomegaly and diffuse fatty infiltration of the liver..  Faint vague areas of increased density in the liver roughly corresponding to findings on the prior exam.  Please also note patient had more recent outside CT abdomen and pelvis 05/28/2023 but those images not available for direct  comparison.  Further evaluation could be obtained with MRI liver mass protocol if not artery performed.  Recommend correlation clinically as well as with any known outside exams.  No acute abdominal findings are seen.  Additional findings as detailed above including subcutaneous fat stranding in the anterior abdominal wall.    Final read      Electronically signed by: Fabby Hester MD  Date:    11/20/2023  Time:    09:15               Narrative:    EXAMINATION:  CT ABDOMEN PELVIS WITHOUT CONTRAST    CLINICAL HISTORY:  Abdominal pain, acute, nonlocalized;    TECHNIQUE:  Low dose axial images, sagittal and coronal reformations were obtained from the lung bases to the pubic symphysis.  No p.o. contrast    COMPARISON:  10/15/2022    FINDINGS:  Liver; enlarged and with severe diffuse fatty infiltration of the liver and some ill-defined areas of increased density for example posteriorly in the right lobe series 2, image 38 measuring approximately 12 mm.  Left lobe series 2 image 48 measuring approximately 7 mm.  These roughly appears similar to prior study of 10/15/2016 which was with IV contrast.  Strict comparison on the noncontrast study cannot be made.    No calcified stones the gallbladder or CT findings of acute cholecystitis or biliary duct dilatation    Spleen not enlarged    Adrenal glands and pancreas unremarkable appearance.  The abdominal aorta tapers without aneurysmal dilatation    Appendix not seen with certainty but no abnormal appendix inflammatory changes appendiceal region is seen and the appearance suggest prior appendectomy.  Mild diverticulosis without CT findings of acute diverticulitis.  No free intraperitoneal air or fluid    Reproductive organs; uterus and adnexal region unremarkable appearance    Fat stranding anterior abdominal wall similar in appearance to the prior study.  No abscess or other drainable fluid collection is seen.    Kidneys, ureters, bladder; no hydronephrosis opaque renal or  ureteral stone or ureteral obstruction.  Urinary bladder just mildly distended at time of the exam and as visualized unremarkable appearance    Tiny fat containing umbilical hernia                                       Medications   ketorolac injection 15 mg (15 mg Intravenous Given 11/19/23 1956)     Medical Decision Making  Emergent evaluation of a 43-year-old female who presents with right lower abdominal pain.  Reports no urinary symptoms.  Denies change in bowel movements.  States history of chronic UTIs for which she is on Macrobid for.  She denies fevers at home.  She is well appearing.  Vital signs are stable.  She has tenderness to the right lower quadrant.  No rebound or guarding.  Urinalysis is unremarkable.  UA is negative.  She has mild leukocytosis, improved from prior.  Renal function is stable.  Lipase is normal.  I doubt acute pancreatitis.  CT scan shows no acute abnormalities.  She has diffuse fatty liver and increased density dizzy in the liver that are found on previous exam.  Discussed these results with the patient, she is aware of them.  Recommend she follow up closely with primary care.  She was given pain medicine and nausea medicine at home. Discussed results with patient. Return precautions given. Based on my clinical evaluation, I do not appreciate any immediate, emergent, or life threatening condition or etiology that warrants additional workup today and feel that the patient can be discharged with close follow up care.  Patient is to follow up with their primary care provider. Case was discussed with Dr. Jarquin who is in agreement with the plan of care. All questions answered.       Amount and/or Complexity of Data Reviewed  Independent Historian: spouse  External Data Reviewed: labs, radiology and notes.  Labs: ordered. Decision-making details documented in ED Course.  Radiology: ordered. Decision-making details documented in ED Course.    Risk  Prescription drug management.               Attending Attestation:     Physician Attestation Statement for NP/PA:       Other NP/PA Attestation Additions:      Medical Decision Making: Jennie Harmon is a 43 y.o. female presenting with 3 days of abdominal pain more right lower quadrant in the setting of prior appendectomy.  No urinary complaints.  Urinalysis is not indicative of UTI.  She does have mild right lower quadrant tenderness with mild voluntary guarding.  No distention, masses, palpable hernias.  No flank tenderness.  Additional imaging sought to exclude alternative intra-abdominal process such as obstructive uropathy, atypical diverticulitis, abscess with overall low suspicion.  She will be signed out to Dr. Whitmore as well as followed by Ms. Arredondo pending imaging with anticipated discharge home with no acute process requiring further intervention is evident.                                 Clinical Impression:  Final diagnoses:  [R10.9] Abdominal pain, unspecified abdominal location (Primary)          ED Disposition Condition    Discharge Stable          ED Prescriptions       Medication Sig Dispense Start Date End Date Auth. Provider    ondansetron (ZOFRAN-ODT) 4 MG TbDL Take 1 tablet (4 mg total) by mouth every 8 (eight) hours as needed (nausea/vomiting). 20 tablet 11/19/2023 -- Keila García PA-C    HYDROcodone-acetaminophen (NORCO) 5-325 mg per tablet Take 1 tablet by mouth every 6 (six) hours as needed for Pain. 12 tablet 11/19/2023 11/22/2023 Keila García PA-C          Follow-up Information       Follow up With Specialties Details Why Contact Info Additional Information    Pankaj Olivera, NP Family Medicine   [address] [place] [address] [place] [id number]  [phone]       [facility] -  Emergency Medicine  As needed [address] [place] [address] Dr Christiano Pandey [id number] 1st floor             Keila García PA-C  11/20/23 [id number]

## 2023-11-20 NOTE — DISCHARGE INSTRUCTIONS
Take nausea and pain medication as needed. Follow up with your primary care provider. For worsening symptoms, chest pain, shortness of breath, increased abdominal pain, high grade fever, stroke or stroke like symptoms, immediately go to the nearest Emergency Room or call 911 as soon as possible.

## 2024-01-21 ENCOUNTER — OFFICE VISIT (OUTPATIENT)
Dept: URGENT CARE | Facility: CLINIC | Age: 44
End: 2024-01-21
Payer: COMMERCIAL

## 2024-01-21 VITALS
DIASTOLIC BLOOD PRESSURE: 81 MMHG | OXYGEN SATURATION: 98 % | TEMPERATURE: 98 F | SYSTOLIC BLOOD PRESSURE: 160 MMHG | BODY MASS INDEX: 48.02 KG/M2 | RESPIRATION RATE: 17 BRPM | HEART RATE: 77 BPM | HEIGHT: 63 IN | WEIGHT: 271 LBS

## 2024-01-21 DIAGNOSIS — R10.822 LEFT UPPER QUADRANT ABDOMINAL TENDERNESS WITH REBOUND TENDERNESS: Primary | ICD-10-CM

## 2024-01-21 DIAGNOSIS — R07.9 CHEST PAIN, UNSPECIFIED TYPE: ICD-10-CM

## 2024-01-21 LAB
CTP QC/QA: YES
CTP QC/QA: YES
POC MOLECULAR INFLUENZA A AGN: NEGATIVE
POC MOLECULAR INFLUENZA B AGN: NEGATIVE
SARS-COV-2 AG RESP QL IA.RAPID: NEGATIVE

## 2024-01-21 PROCEDURE — 87811 SARS-COV-2 COVID19 W/OPTIC: CPT | Mod: QW,S$GLB,, | Performed by: NURSE PRACTITIONER

## 2024-01-21 PROCEDURE — 87804 INFLUENZA ASSAY W/OPTIC: CPT | Mod: QW,59,S$GLB, | Performed by: NURSE PRACTITIONER

## 2024-01-21 PROCEDURE — 93000 ELECTROCARDIOGRAM COMPLETE: CPT | Mod: S$GLB,,, | Performed by: NURSE PRACTITIONER

## 2024-01-21 PROCEDURE — 99214 OFFICE O/P EST MOD 30 MIN: CPT | Mod: 25,S$GLB,, | Performed by: NURSE PRACTITIONER

## 2024-01-21 RX ORDER — PNV NO.95/FERROUS FUM/FOLIC AC 28MG-0.8MG
100 TABLET ORAL DAILY
COMMUNITY

## 2024-01-21 RX ORDER — PRAVASTATIN SODIUM 10 MG/1
10 TABLET ORAL DAILY
COMMUNITY

## 2024-01-21 NOTE — PROGRESS NOTES
"Subjective:      Patient ID: Jennie Harmon is a 43 y.o. female.    Vitals:  height is 5' 3" (1.6 m) and weight is 122.9 kg (271 lb). Her oral temperature is 97.6 °F (36.4 °C). Her blood pressure is 160/81 (abnormal) and her pulse is 77. Her respiration is 17 and oxygen saturation is 98%.     Chief Complaint: Chest Pain    43-year-old female presents with chest pain on inhalation x 2 days that is worsening. She reports 6/10 pain. She denies any fever, cough, or body aches. She has a past medical history of CHF, Diabetes, GERD.    Chest Pain   This is a new problem. The current episode started in the past 7 days (3 days). The onset quality is gradual. The problem occurs constantly. The problem has been gradually worsening. The pain is at a severity of 6/10. The pain is mild. The quality of the pain is described as heavy. The pain does not radiate. Associated symptoms include headaches and shortness of breath. Pertinent negatives include no fever. The pain is aggravated by deep breathing, breathing and movement. She has tried nothing for the symptoms. The treatment provided no relief.   Her past medical history is significant for CHF.       Constitution: Negative for fever.   Cardiovascular:  Positive for chest pain. Negative for chest trauma.   Respiratory:  Positive for shortness of breath.    Neurological:  Positive for headaches.      Objective:     Physical Exam   Constitutional: She is oriented to person, place, and time. She appears well-developed. She is cooperative.  Non-toxic appearance. She does not appear ill. No distress. normal  HENT:   Head: Normocephalic and atraumatic.   Ears:   Right Ear: Hearing, tympanic membrane, external ear and ear canal normal.   Left Ear: Hearing, tympanic membrane, external ear and ear canal normal.   Nose: Nose normal. No mucosal edema, rhinorrhea or nasal deformity. No epistaxis. Right sinus exhibits no maxillary sinus tenderness and no frontal sinus tenderness. Left sinus " exhibits no maxillary sinus tenderness and no frontal sinus tenderness.   Mouth/Throat: Uvula is midline, oropharynx is clear and moist and mucous membranes are normal. No trismus in the jaw. Normal dentition. No uvula swelling. No oropharyngeal exudate, posterior oropharyngeal edema or posterior oropharyngeal erythema.   Eyes: Conjunctivae and lids are normal. No scleral icterus.   Neck: Trachea normal and phonation normal. Neck supple. No edema present. No erythema present. No neck rigidity present.   Cardiovascular: Normal rate, regular rhythm, normal heart sounds and normal pulses.      Comments: Tenderness noted to right anterior rib   Pulmonary/Chest: Effort normal and breath sounds normal. No respiratory distress. She has no decreased breath sounds. She has no rhonchi.   Abdominal: Normal appearance and bowel sounds are normal. She exhibits no distension and no mass. There is abdominal tenderness (LUQ, severe). There is no rebound, no guarding, no left CVA tenderness and no right CVA tenderness. No hernia.   Musculoskeletal: Normal range of motion.         General: No deformity. Normal range of motion.   Neurological: She is alert and oriented to person, place, and time. She exhibits normal muscle tone. Coordination normal.   Skin: Skin is warm, dry, intact, not diaphoretic and not pale.   Psychiatric: Her speech is normal and behavior is normal. Judgment and thought content normal.   Nursing note and vitals reviewed.      Assessment:     1. Left upper quadrant abdominal tenderness with rebound tenderness    2. Chest pain, unspecified type        Plan:   Negative for Covid and Flu  CXR negative  EKG - prolonged QTC, at baseline from previous EKGs. No ST changes noted.   Severe tenderness noted to abdomen LUQ   Advised further evaluation in the ED due to significant abdominal tenderness on exam and chest pain associated with taking a deep breath.   Patient declined EMS transport. Her , Phan, will  take her to the ED in private vehcile. Risks understood with taking private vehicle.     Left upper quadrant abdominal tenderness with rebound tenderness    Chest pain, unspecified type  -     EKG 12-lead; Future  -     XR CHEST PA AND LATERAL; Future; Expected date: 01/21/2024  -     SARS Coronavirus 2 Antigen, POCT Manual Read  -     POCT Influenza A/B MOLECULAR

## 2024-06-02 ENCOUNTER — OFFICE VISIT (OUTPATIENT)
Dept: URGENT CARE | Facility: CLINIC | Age: 44
End: 2024-06-02
Payer: COMMERCIAL

## 2024-06-02 VITALS
RESPIRATION RATE: 16 BRPM | HEIGHT: 62 IN | DIASTOLIC BLOOD PRESSURE: 88 MMHG | HEART RATE: 74 BPM | BODY MASS INDEX: 47.84 KG/M2 | WEIGHT: 260 LBS | OXYGEN SATURATION: 98 % | TEMPERATURE: 98 F | SYSTOLIC BLOOD PRESSURE: 131 MMHG

## 2024-06-02 DIAGNOSIS — G89.29 CHRONIC PAIN OF LEFT KNEE: Primary | ICD-10-CM

## 2024-06-02 DIAGNOSIS — S89.92XA INJURY OF LEFT KNEE, INITIAL ENCOUNTER: ICD-10-CM

## 2024-06-02 DIAGNOSIS — M25.562 CHRONIC PAIN OF LEFT KNEE: Primary | ICD-10-CM

## 2024-06-02 PROCEDURE — 99214 OFFICE O/P EST MOD 30 MIN: CPT | Mod: S$GLB,,, | Performed by: STUDENT IN AN ORGANIZED HEALTH CARE EDUCATION/TRAINING PROGRAM

## 2024-06-02 RX ORDER — CELECOXIB 200 MG/1
200 CAPSULE ORAL 2 TIMES DAILY
COMMUNITY

## 2024-06-02 RX ORDER — METHOCARBAMOL 500 MG/1
500 TABLET, FILM COATED ORAL 4 TIMES DAILY PRN
Qty: 20 TABLET | Refills: 0 | Status: SHIPPED | OUTPATIENT
Start: 2024-06-02 | End: 2024-06-07

## 2024-06-02 NOTE — PROGRESS NOTES
"Subjective:      Patient ID: Jennie Harmon is a 43 y.o. female.    Vitals:  height is 5' 2" (1.575 m) and weight is 117.9 kg (260 lb). Her temperature is 97.9 °F (36.6 °C). Her blood pressure is 131/88 and her pulse is 74. Her respiration is 16 and oxygen saturation is 98%.     Chief Complaint: Knee Pain    Patient is a 43-year-old female with a past medical history of type 2 diabetes, anxiety, depression, glaucoma, and congestive heart failure after sepsis diagnosis who presents to clinic for evaluation of left knee pain.  Patient reports has experienced left knee pain for many months now.  Patient states that she has currently seeing an orthopedist for this.  Patient states she is waiting on prior authorization for her gel injection.  Patient states last saw the orthopedist a couple of weeks ago.  Patient states that she fell a couple of weeks before Easter onto the left knee.  Patient states it has not been x-rays since that time.  Patient states that she is experiencing constant pain to the left knee.  Patient states pain at current is a 6 or 7 on 10 scale but as worst a 9 or 10 on 10 scale.  Patient states pain does not radiate to any other location.  Patient states hurting just inside of the left knee.  Patient states intermittent swelling and pain along she stands or walks on a knee.  Patient states that pain is aggravated by range of motion, ambulation, weight-bearing.  Patient states pain is alleviated slightly with rest.  Patient states she has not noticed any skin discoloration, open wounds, or paresthesias to include numbness or tingling.  Patient requesting x-ray of the left knee.    Knee Pain   The injury mechanism was a fall. The pain is present in the left knee. The quality of the pain is described as shooting. The pain is at a severity of 7/10. Associated symptoms include a loss of motion. Pertinent negatives include no numbness. Associated symptoms comments: feverish. The symptoms are aggravated by " movement, weight bearing and palpation.       Constitution: Negative. Negative for chills, sweating, fatigue and fever.   HENT: Negative.     Neck: neck negative.   Cardiovascular: Negative.  Negative for chest pain and palpitations.   Eyes: Negative.    Respiratory: Negative.  Negative for chest tightness, cough and shortness of breath.    Gastrointestinal: Negative.  Negative for abdominal pain, nausea, vomiting and diarrhea.   Endocrine: negative.   Genitourinary: Negative.  Negative for dysuria, frequency and urgency.   Musculoskeletal:  Positive for trauma (Fall), joint pain (Left knee), joint swelling (Left knee) and abnormal ROM of joint (Left knee).   Skin: Negative.  Negative for color change, pale, rash and erythema.   Allergic/Immunologic: Negative.    Neurological:  Negative for dizziness, light-headedness, passing out, headaches, disorientation, altered mental status, numbness and tingling.   Hematologic/Lymphatic: Negative.    Psychiatric/Behavioral: Negative.  Negative for altered mental status, disorientation and confusion.       Objective:     Physical Exam   Constitutional: She is oriented to person, place, and time. She appears well-developed. She is cooperative.  Non-toxic appearance. She does not appear ill. No distress. obesity  HENT:   Head: Normocephalic and atraumatic.   Ears:   Right Ear: Hearing and external ear normal.   Left Ear: Hearing and external ear normal.   Nose: Nose normal. No mucosal edema or nasal deformity. No epistaxis. Right sinus exhibits no maxillary sinus tenderness and no frontal sinus tenderness. Left sinus exhibits no maxillary sinus tenderness and no frontal sinus tenderness.   Mouth/Throat: Uvula is midline, oropharynx is clear and moist and mucous membranes are normal. No trismus in the jaw. Normal dentition. No uvula swelling.   Eyes: Conjunctivae and lids are normal. Pupils are equal, round, and reactive to light.   Neck: Trachea normal and phonation normal. Neck  supple.   Cardiovascular: Normal rate, regular rhythm, normal heart sounds and normal pulses.   Pulmonary/Chest: Effort normal and breath sounds normal. No respiratory distress. She has no wheezes. She has no rhonchi. She has no rales.   Abdominal: Normal appearance and bowel sounds are normal. She exhibits no distension. Soft.   Musculoskeletal:      Left knee: She exhibits decreased range of motion and swelling. She exhibits no ecchymosis, no erythema, normal alignment, normal patellar mobility and normal meniscus. Tenderness found. Medial joint line tenderness noted.   Neurological: She is alert and oriented to person, place, and time. She exhibits normal muscle tone.   Skin: Skin is warm, dry, intact, not diaphoretic, not pale and no rash. Capillary refill takes less than 2 seconds. No bruising and No erythema   Psychiatric: Her speech is normal and behavior is normal. Judgment and thought content normal.   Nursing note and vitals reviewed.      Assessment:     1. Chronic pain of left knee    2. Injury of left knee, initial encounter        Plan:       Chronic pain of left knee  -     XR KNEE 3 VIEW LEFT; Future; Expected date: 06/02/2024    Injury of left knee, initial encounter  -     XR KNEE 3 VIEW LEFT; Future; Expected date: 06/02/2024    Other orders  -     methocarbamoL (ROBAXIN) 500 MG Tab; Take 1 tablet (500 mg total) by mouth 4 (four) times daily as needed (Pain).  Dispense: 20 tablet; Refill: 0                X-ray left knee: No displaced fracture or traumatic malalignment. Minimal tricompartmental degenerative change with marginal spurs. Small knee joint effusion.   Take medications as prescribed.    Use of no other muscle relaxers or opioids while on Robaxin.    Use of no muscle relaxers while working, driving, or operating heavy machinery.    Continue Celebrex as directed.    Rest.  Ice.  Compression.  Elevation.    Follow-up with PCP in 1-2 days.    Follow-up orthopedics as scheduled; sooner as  needed.    Return to clinic as needed.    To ED for any new or acutely worsening symptoms.    Patient in agreement with plan of care.    DISCLAIMER: Please note that my documentation in this Electronic Healthcare Record was produced using speech recognition software and therefore may contain errors related to that software system.These could include grammar, punctuation and spelling errors or the inclusion/exclusion of phrases that were not intended. Garbled syntax, mangled pronouns, and other bizarre constructions may be attributed to that software system.

## 2024-07-18 ENCOUNTER — OFFICE VISIT (OUTPATIENT)
Dept: URGENT CARE | Facility: CLINIC | Age: 44
End: 2024-07-18
Payer: COMMERCIAL

## 2024-07-18 VITALS
BODY MASS INDEX: 47.84 KG/M2 | HEART RATE: 76 BPM | RESPIRATION RATE: 16 BRPM | SYSTOLIC BLOOD PRESSURE: 140 MMHG | HEIGHT: 62 IN | TEMPERATURE: 98 F | DIASTOLIC BLOOD PRESSURE: 83 MMHG | OXYGEN SATURATION: 98 % | WEIGHT: 260 LBS

## 2024-07-18 DIAGNOSIS — M25.562 ACUTE PAIN OF LEFT KNEE: Primary | ICD-10-CM

## 2024-07-18 PROCEDURE — 99214 OFFICE O/P EST MOD 30 MIN: CPT | Mod: 25,S$GLB,, | Performed by: PHYSICAL THERAPY ASSISTANT

## 2024-07-18 PROCEDURE — 96372 THER/PROPH/DIAG INJ SC/IM: CPT | Mod: S$GLB,,, | Performed by: PHYSICAL THERAPY ASSISTANT

## 2024-07-18 RX ORDER — METHOCARBAMOL 500 MG/1
500 TABLET, FILM COATED ORAL 4 TIMES DAILY
Qty: 40 TABLET | Refills: 0 | Status: SHIPPED | OUTPATIENT
Start: 2024-07-18 | End: 2024-07-28

## 2024-07-18 RX ORDER — KETOROLAC TROMETHAMINE 30 MG/ML
30 INJECTION, SOLUTION INTRAMUSCULAR; INTRAVENOUS
Status: COMPLETED | OUTPATIENT
Start: 2024-07-18 | End: 2024-07-18

## 2024-07-18 RX ADMIN — KETOROLAC TROMETHAMINE 30 MG: 30 INJECTION, SOLUTION INTRAMUSCULAR; INTRAVENOUS at 05:07

## 2024-07-18 NOTE — PATIENT INSTRUCTIONS
Thank you for allowing me to help with your care today.  Take medications as prescribed.  Follow up with your orthopedic provider.  Ice as needed for pain control.  Continue Celebrex.  Return to the clinic as needed.  Report to the emergency department if symptoms worsen or become emergent.

## 2024-07-18 NOTE — PROGRESS NOTES
"Subjective:      Patient ID: Jennie Harmon is a 43 y.o. female.    Vitals:  height is 5' 2" (1.575 m) and weight is 117.9 kg (260 lb). Her temperature is 97.8 °F (36.6 °C). Her blood pressure is 140/83 (abnormal) and her pulse is 76. Her respiration is 16 and oxygen saturation is 98%.     Chief Complaint: Knee Pain    Patient is a 43-year-old female with a past medical history of chronic knee pain, obesity, and DM 2.  Patient presents to the clinic today with complaints of left knee pain.  Patient states she stepped off the stairs and had a jamming injury to the left knee.  Patient states she does have chronic pain and does follow with a orthopedist at has clinic.  Patient denies fever, chills, or other acute complaints at this time.    Knee Pain   The incident occurred 6 to 12 hours ago. The incident occurred at home. The injury mechanism was a fall. The pain is present in the left knee. Quality: grabbin type pain. Associated symptoms include an inability to bear weight and a loss of motion. The symptoms are aggravated by movement and weight bearing.       Constitution: Negative. Negative for chills, fatigue and fever.   HENT: Negative.     Neck: neck negative.   Cardiovascular: Negative.  Negative for chest pain.   Eyes: Negative.    Respiratory: Negative.  Negative for cough and shortness of breath.    Gastrointestinal: Negative.  Negative for abdominal pain, nausea and vomiting.   Endocrine: negative.   Genitourinary: Negative.    Musculoskeletal:  Positive for joint pain (left knee). Negative for muscle ache.   Skin: Negative.  Negative for color change, pale, rash and erythema.   Allergic/Immunologic: Negative.    Neurological: Negative.  Negative for dizziness, light-headedness, passing out, headaches, disorientation and altered mental status.   Hematologic/Lymphatic: Negative.    Psychiatric/Behavioral: Negative.  Negative for altered mental status, disorientation and confusion.       Objective:     Physical " Exam   Constitutional: She is oriented to person, place, and time. She appears well-developed. She is cooperative. No distress.   HENT:   Head: Normocephalic and atraumatic. Head is without abrasion, without contusion and without laceration.   Ears:   Right Ear: Hearing normal. No hemotympanum.   Left Ear: Hearing normal. No hemotympanum.   Nose: Nose normal. No mucosal edema or nasal deformity. No epistaxis. Right sinus exhibits no maxillary sinus tenderness and no frontal sinus tenderness. Left sinus exhibits no maxillary sinus tenderness and no frontal sinus tenderness.   Mouth/Throat: Uvula is midline, oropharynx is clear and moist and mucous membranes are normal. No trismus in the jaw. Normal dentition. No uvula swelling.   Eyes: Conjunctivae, EOM and lids are normal. Pupils are equal, round, and reactive to light.   Neck: Trachea normal and phonation normal. No tracheal deviation present. No spinous process tenderness present. No muscular tenderness present.   Cardiovascular: Normal rate, regular rhythm, normal heart sounds and normal pulses.   Pulmonary/Chest: Effort normal and breath sounds normal. No respiratory distress.   Abdominal: Normal appearance and bowel sounds are normal. Soft.   Musculoskeletal: Normal range of motion.         General: Tenderness (left posterior and medial knee joint) present. Normal range of motion.      Comments: Ataxic gait left lower extremity   Neurological: She is alert and oriented to person, place, and time. She has normal strength. She exhibits normal muscle tone. She displays no seizure activity. GCS eye subscore is 4. GCS verbal subscore is 5. GCS motor subscore is 6.   Skin: Skin is warm, dry and intact. Capillary refill takes less than 2 seconds. No abrasion, No burn, No bruising, No erythema and No ecchymosis   Psychiatric: Her speech is normal and behavior is normal. Judgment and thought content normal.   Nursing note and vitals reviewed.      Assessment:     1.  Acute pain of left knee        Plan:       Acute pain of left knee  -     XR KNEE 3 VIEW left      Other orders  -     ketorolac injection 30 mg  -     methocarbamoL (ROBAXIN) 500 MG Tab; Take 1 tablet (500 mg total) by mouth 4 (four) times daily. for 10 days  Dispense: 40 tablet; Refill: 0    Take medications as prescribed.  Recommend following up with ortho provider outpatient.  X-ray without acute fracture or dislocation.  Ice as needed for pain control.  Monitor symptoms and return to clinic as needed.  Reports the emergency department if symptoms become emergent or worsen.

## 2024-07-22 ENCOUNTER — TELEPHONE (OUTPATIENT)
Dept: URGENT CARE | Facility: CLINIC | Age: 44
End: 2024-07-22
Payer: COMMERCIAL

## 2024-07-22 NOTE — TELEPHONE ENCOUNTER
Pt notified to (-) Knee xray results. She states her knee is not doing any better she has a MRI scheduled for Wednesday 7/24/24. Shannon Carrera NP notified

## (undated) DEVICE — CUFF TOURNIQUET 18DUAL PRT 5921-218-235

## (undated) DEVICE — SUTURE ETHILON 3-0 PS-1 18 1663H

## (undated) DEVICE — PAD BOVIE ADULT

## (undated) DEVICE — TRAY UPPER EXTREMITY

## (undated) DEVICE — GLOVE BIOGEL PI GOLD SZ  8.5

## (undated) DEVICE — UNDERGLOVE BIOGEL MICRO BLUE SZ 8.5

## (undated) DEVICE — SPONGE GAUZE 10S 4X4  442214

## (undated) DEVICE — PADDING CAST 3 STERILE 30-320

## (undated) DEVICE — SOLUTION IRRI NS BOTTLE 1000ML R5200-01